# Patient Record
Sex: FEMALE | Race: WHITE | Employment: OTHER | URBAN - METROPOLITAN AREA
[De-identification: names, ages, dates, MRNs, and addresses within clinical notes are randomized per-mention and may not be internally consistent; named-entity substitution may affect disease eponyms.]

---

## 2017-03-06 ENCOUNTER — APPOINTMENT (OUTPATIENT)
Dept: CT IMAGING | Age: 82
DRG: 066 | End: 2017-03-06
Attending: EMERGENCY MEDICINE
Payer: MEDICARE

## 2017-03-06 ENCOUNTER — HOSPITAL ENCOUNTER (INPATIENT)
Age: 82
LOS: 3 days | Discharge: NURSING, REHAB OR LONG-TERM CARE | DRG: 066 | End: 2017-03-09
Attending: EMERGENCY MEDICINE | Admitting: INTERNAL MEDICINE
Payer: MEDICARE

## 2017-03-06 ENCOUNTER — APPOINTMENT (OUTPATIENT)
Dept: MRI IMAGING | Age: 82
DRG: 066 | End: 2017-03-06
Attending: EMERGENCY MEDICINE
Payer: MEDICARE

## 2017-03-06 ENCOUNTER — APPOINTMENT (OUTPATIENT)
Dept: GENERAL RADIOLOGY | Age: 82
DRG: 066 | End: 2017-03-06
Attending: EMERGENCY MEDICINE
Payer: MEDICARE

## 2017-03-06 DIAGNOSIS — R51.9 NONINTRACTABLE EPISODIC HEADACHE, UNSPECIFIED HEADACHE TYPE: ICD-10-CM

## 2017-03-06 DIAGNOSIS — R47.01 EXPRESSIVE APHASIA: Primary | ICD-10-CM

## 2017-03-06 DIAGNOSIS — E86.0 MILD DEHYDRATION: ICD-10-CM

## 2017-03-06 DIAGNOSIS — I63.512 ACUTE ISCHEMIC LEFT MCA STROKE (HCC): ICD-10-CM

## 2017-03-06 PROBLEM — I63.9 STROKE (HCC): Status: ACTIVE | Noted: 2017-03-06

## 2017-03-06 PROBLEM — R56.9 SEIZURE (HCC): Status: ACTIVE | Noted: 2017-03-06

## 2017-03-06 LAB
ALBUMIN SERPL BCP-MCNC: 3.9 G/DL (ref 3.4–5)
ALBUMIN/GLOB SERPL: 1 {RATIO} (ref 0.8–1.7)
ALP SERPL-CCNC: 88 U/L (ref 45–117)
ALT SERPL-CCNC: 28 U/L (ref 13–56)
ANION GAP BLD CALC-SCNC: 18 MMOL/L (ref 10–20)
ANION GAP BLD CALC-SCNC: 9 MMOL/L (ref 3–18)
APPEARANCE UR: CLEAR
APTT PPP: 26.5 SEC (ref 23–36.4)
AST SERPL W P-5'-P-CCNC: 19 U/L (ref 15–37)
BASOPHILS # BLD AUTO: 0 K/UL (ref 0–0.06)
BASOPHILS # BLD: 0 % (ref 0–2)
BILIRUB SERPL-MCNC: 0.6 MG/DL (ref 0.2–1)
BILIRUB UR QL: NEGATIVE
BUN BLD-MCNC: 10 MG/DL (ref 7–18)
BUN SERPL-MCNC: 10 MG/DL (ref 7–18)
BUN/CREAT SERPL: 10 (ref 12–20)
CA-I BLD-MCNC: 1.23 MMOL/L (ref 1.12–1.32)
CALCIUM SERPL-MCNC: 9.2 MG/DL (ref 8.5–10.1)
CHLORIDE BLD-SCNC: 94 MMOL/L (ref 100–108)
CHLORIDE SERPL-SCNC: 97 MMOL/L (ref 100–108)
CK MB CFR SERPL CALC: NORMAL % (ref 0–4)
CK MB SERPL-MCNC: <1 NG/ML (ref 5–25)
CK SERPL-CCNC: 61 U/L (ref 26–192)
CO2 BLD-SCNC: 25 MMOL/L (ref 19–24)
CO2 SERPL-SCNC: 27 MMOL/L (ref 21–32)
COLOR UR: YELLOW
CREAT SERPL-MCNC: 1.03 MG/DL (ref 0.6–1.3)
CREAT UR-MCNC: 0.9 MG/DL (ref 0.6–1.3)
DIFFERENTIAL METHOD BLD: NORMAL
EOSINOPHIL # BLD: 0.1 K/UL (ref 0–0.4)
EOSINOPHIL NFR BLD: 2 % (ref 0–5)
ERYTHROCYTE [DISTWIDTH] IN BLOOD BY AUTOMATED COUNT: 12.5 % (ref 11.6–14.5)
GLOBULIN SER CALC-MCNC: 3.9 G/DL (ref 2–4)
GLUCOSE BLD STRIP.AUTO-MCNC: 131 MG/DL (ref 74–106)
GLUCOSE BLD STRIP.AUTO-MCNC: 141 MG/DL (ref 70–110)
GLUCOSE SERPL-MCNC: 133 MG/DL (ref 74–99)
GLUCOSE UR STRIP.AUTO-MCNC: NEGATIVE MG/DL
HCT VFR BLD AUTO: 37.9 % (ref 35–45)
HCT VFR BLD CALC: 39 % (ref 36–49)
HGB BLD-MCNC: 12.9 G/DL (ref 12–16)
HGB BLD-MCNC: 13.3 G/DL (ref 12–16)
HGB UR QL STRIP: NEGATIVE
INR PPP: 0.9 (ref 0.8–1.2)
KETONES UR QL STRIP.AUTO: NEGATIVE MG/DL
LEUKOCYTE ESTERASE UR QL STRIP.AUTO: NEGATIVE
LYMPHOCYTES # BLD AUTO: 31 % (ref 21–52)
LYMPHOCYTES # BLD: 2.1 K/UL (ref 0.9–3.6)
MCH RBC QN AUTO: 30.4 PG (ref 24–34)
MCHC RBC AUTO-ENTMCNC: 34 G/DL (ref 31–37)
MCV RBC AUTO: 89.2 FL (ref 74–97)
MONOCYTES # BLD: 0.4 K/UL (ref 0.05–1.2)
MONOCYTES NFR BLD AUTO: 6 % (ref 3–10)
NEUTS SEG # BLD: 4.2 K/UL (ref 1.8–8)
NEUTS SEG NFR BLD AUTO: 61 % (ref 40–73)
NITRITE UR QL STRIP.AUTO: NEGATIVE
PH UR STRIP: 7 [PH] (ref 5–8)
PLATELET # BLD AUTO: 207 K/UL (ref 135–420)
PMV BLD AUTO: 9.6 FL (ref 9.2–11.8)
POTASSIUM BLD-SCNC: 3.8 MMOL/L (ref 3.5–5.5)
POTASSIUM SERPL-SCNC: 3.9 MMOL/L (ref 3.5–5.5)
PROT SERPL-MCNC: 7.8 G/DL (ref 6.4–8.2)
PROT UR STRIP-MCNC: NEGATIVE MG/DL
PROTHROMBIN TIME: 12 SEC (ref 11.5–15.2)
RBC # BLD AUTO: 4.25 M/UL (ref 4.2–5.3)
SODIUM BLD-SCNC: 133 MMOL/L (ref 136–145)
SODIUM SERPL-SCNC: 133 MMOL/L (ref 136–145)
SP GR UR REFRACTOMETRY: >1.03 (ref 1–1.03)
TROPONIN I SERPL-MCNC: <0.02 NG/ML (ref 0–0.06)
UROBILINOGEN UR QL STRIP.AUTO: 0.2 EU/DL (ref 0.2–1)
WBC # BLD AUTO: 6.9 K/UL (ref 4.6–13.2)

## 2017-03-06 PROCEDURE — 99285 EMERGENCY DEPT VISIT HI MDM: CPT

## 2017-03-06 PROCEDURE — 74011250637 HC RX REV CODE- 250/637: Performed by: EMERGENCY MEDICINE

## 2017-03-06 PROCEDURE — 51701 INSERT BLADDER CATHETER: CPT

## 2017-03-06 PROCEDURE — 96374 THER/PROPH/DIAG INJ IV PUSH: CPT

## 2017-03-06 PROCEDURE — 71010 XR CHEST PORT: CPT

## 2017-03-06 PROCEDURE — 80053 COMPREHEN METABOLIC PANEL: CPT | Performed by: EMERGENCY MEDICINE

## 2017-03-06 PROCEDURE — 77030011943

## 2017-03-06 PROCEDURE — 80047 BASIC METABLC PNL IONIZED CA: CPT

## 2017-03-06 PROCEDURE — 85730 THROMBOPLASTIN TIME PARTIAL: CPT | Performed by: EMERGENCY MEDICINE

## 2017-03-06 PROCEDURE — 82962 GLUCOSE BLOOD TEST: CPT

## 2017-03-06 PROCEDURE — 85610 PROTHROMBIN TIME: CPT | Performed by: EMERGENCY MEDICINE

## 2017-03-06 PROCEDURE — 81003 URINALYSIS AUTO W/O SCOPE: CPT | Performed by: EMERGENCY MEDICINE

## 2017-03-06 PROCEDURE — 93005 ELECTROCARDIOGRAM TRACING: CPT

## 2017-03-06 PROCEDURE — 74011250636 HC RX REV CODE- 250/636: Performed by: EMERGENCY MEDICINE

## 2017-03-06 PROCEDURE — 65660000000 HC RM CCU STEPDOWN

## 2017-03-06 PROCEDURE — 70496 CT ANGIOGRAPHY HEAD: CPT

## 2017-03-06 PROCEDURE — 85025 COMPLETE CBC W/AUTO DIFF WBC: CPT | Performed by: EMERGENCY MEDICINE

## 2017-03-06 PROCEDURE — 82550 ASSAY OF CK (CPK): CPT | Performed by: EMERGENCY MEDICINE

## 2017-03-06 PROCEDURE — 70551 MRI BRAIN STEM W/O DYE: CPT

## 2017-03-06 PROCEDURE — 70450 CT HEAD/BRAIN W/O DYE: CPT

## 2017-03-06 PROCEDURE — 84443 ASSAY THYROID STIM HORMONE: CPT | Performed by: INTERNAL MEDICINE

## 2017-03-06 RX ORDER — SODIUM CHLORIDE 9 MG/ML
100 INJECTION, SOLUTION INTRAVENOUS CONTINUOUS
Status: DISCONTINUED | OUTPATIENT
Start: 2017-03-06 | End: 2017-03-09 | Stop reason: HOSPADM

## 2017-03-06 RX ORDER — METOPROLOL SUCCINATE 50 MG/1
50 TABLET, EXTENDED RELEASE ORAL
Status: DISCONTINUED | OUTPATIENT
Start: 2017-03-06 | End: 2017-03-07

## 2017-03-06 RX ORDER — FERROUS SULFATE, DRIED 160(50) MG
1 TABLET, EXTENDED RELEASE ORAL 2 TIMES DAILY
Status: DISCONTINUED | OUTPATIENT
Start: 2017-03-07 | End: 2017-03-09 | Stop reason: HOSPADM

## 2017-03-06 RX ORDER — MORPHINE SULFATE 2 MG/ML
2 INJECTION, SOLUTION INTRAMUSCULAR; INTRAVENOUS
Status: COMPLETED | OUTPATIENT
Start: 2017-03-06 | End: 2017-03-06

## 2017-03-06 RX ORDER — LORATADINE 10 MG/1
10 TABLET ORAL
COMMUNITY
End: 2017-03-09

## 2017-03-06 RX ORDER — ATORVASTATIN CALCIUM 20 MG/1
80 TABLET, FILM COATED ORAL DAILY
Status: DISCONTINUED | OUTPATIENT
Start: 2017-03-07 | End: 2017-03-09 | Stop reason: HOSPADM

## 2017-03-06 RX ORDER — ALPRAZOLAM 0.5 MG/1
0.5 TABLET ORAL
Status: DISCONTINUED | OUTPATIENT
Start: 2017-03-06 | End: 2017-03-09 | Stop reason: HOSPADM

## 2017-03-06 RX ORDER — MORPHINE SULFATE 2 MG/ML
2 INJECTION, SOLUTION INTRAMUSCULAR; INTRAVENOUS ONCE
Status: DISCONTINUED | OUTPATIENT
Start: 2017-03-06 | End: 2017-03-06

## 2017-03-06 RX ORDER — LEVETIRACETAM 500 MG/1
500 TABLET ORAL 3 TIMES DAILY
COMMUNITY

## 2017-03-06 RX ORDER — DEXTROSE 50 % IN WATER (D50W) INTRAVENOUS SYRINGE
25-50 AS NEEDED
Status: DISCONTINUED | OUTPATIENT
Start: 2017-03-06 | End: 2017-03-09 | Stop reason: HOSPADM

## 2017-03-06 RX ORDER — CLOPIDOGREL BISULFATE 75 MG/1
75 TABLET ORAL DAILY
Status: DISCONTINUED | OUTPATIENT
Start: 2017-03-07 | End: 2017-03-09 | Stop reason: HOSPADM

## 2017-03-06 RX ORDER — MELATONIN
2000 DAILY
Status: DISCONTINUED | OUTPATIENT
Start: 2017-03-07 | End: 2017-03-09 | Stop reason: HOSPADM

## 2017-03-06 RX ORDER — TRANDOLAPRIL 2 MG/1
4 TABLET ORAL DAILY
Status: DISCONTINUED | OUTPATIENT
Start: 2017-03-07 | End: 2017-03-07

## 2017-03-06 RX ORDER — LOSARTAN POTASSIUM 50 MG/1
100 TABLET ORAL DAILY
Status: DISCONTINUED | OUTPATIENT
Start: 2017-03-07 | End: 2017-03-07

## 2017-03-06 RX ORDER — FUROSEMIDE 20 MG/1
20 TABLET ORAL
Status: DISCONTINUED | OUTPATIENT
Start: 2017-03-06 | End: 2017-03-07

## 2017-03-06 RX ORDER — LORATADINE 10 MG/1
10 TABLET ORAL
Status: DISCONTINUED | OUTPATIENT
Start: 2017-03-06 | End: 2017-03-09 | Stop reason: HOSPADM

## 2017-03-06 RX ORDER — HYDROCHLOROTHIAZIDE 12.5 MG/1
12.5 CAPSULE ORAL DAILY
Status: DISCONTINUED | OUTPATIENT
Start: 2017-03-07 | End: 2017-03-07

## 2017-03-06 RX ORDER — MAGNESIUM SULFATE 100 %
16 CRYSTALS MISCELLANEOUS AS NEEDED
Status: DISCONTINUED | OUTPATIENT
Start: 2017-03-06 | End: 2017-03-09 | Stop reason: HOSPADM

## 2017-03-06 RX ORDER — FUROSEMIDE 20 MG/1
20 TABLET ORAL
COMMUNITY

## 2017-03-06 RX ORDER — DEXTROSE MONOHYDRATE AND SODIUM CHLORIDE 5; .45 G/100ML; G/100ML
50 INJECTION, SOLUTION INTRAVENOUS CONTINUOUS
Status: DISCONTINUED | OUTPATIENT
Start: 2017-03-06 | End: 2017-03-06

## 2017-03-06 RX ORDER — SERTRALINE HYDROCHLORIDE 50 MG/1
25 TABLET, FILM COATED ORAL
Status: DISCONTINUED | OUTPATIENT
Start: 2017-03-06 | End: 2017-03-09 | Stop reason: HOSPADM

## 2017-03-06 RX ORDER — ASPIRIN 300 MG/1
300 SUPPOSITORY RECTAL
Status: COMPLETED | OUTPATIENT
Start: 2017-03-06 | End: 2017-03-06

## 2017-03-06 RX ORDER — ALPRAZOLAM 0.5 MG/1
0.5 TABLET ORAL
COMMUNITY
End: 2017-03-09

## 2017-03-06 RX ORDER — SERTRALINE HYDROCHLORIDE 25 MG/1
25 TABLET, FILM COATED ORAL
COMMUNITY

## 2017-03-06 RX ORDER — POTASSIUM CHLORIDE 750 MG/1
10 TABLET, EXTENDED RELEASE ORAL
COMMUNITY

## 2017-03-06 RX ORDER — NITROFURANTOIN MACROCRYSTALS 50 MG/1
50 CAPSULE ORAL DAILY
COMMUNITY
End: 2017-03-09

## 2017-03-06 RX ORDER — ASPIRIN 81 MG/1
81 TABLET ORAL DAILY
Status: DISCONTINUED | OUTPATIENT
Start: 2017-03-07 | End: 2017-03-08

## 2017-03-06 RX ORDER — METAPROTERENOL SULFATE 20 MG
TABLET ORAL
COMMUNITY
End: 2017-03-09

## 2017-03-06 RX ORDER — LEVETIRACETAM 500 MG/1
500 TABLET ORAL 3 TIMES DAILY
Status: DISCONTINUED | OUTPATIENT
Start: 2017-03-06 | End: 2017-03-08

## 2017-03-06 RX ORDER — POTASSIUM CHLORIDE 750 MG/1
10 TABLET, EXTENDED RELEASE ORAL
Status: DISCONTINUED | OUTPATIENT
Start: 2017-03-06 | End: 2017-03-09 | Stop reason: HOSPADM

## 2017-03-06 RX ORDER — SODIUM CHLORIDE 0.9 % (FLUSH) 0.9 %
5-10 SYRINGE (ML) INJECTION EVERY 8 HOURS
Status: DISCONTINUED | OUTPATIENT
Start: 2017-03-06 | End: 2017-03-09 | Stop reason: HOSPADM

## 2017-03-06 RX ORDER — SODIUM CHLORIDE 0.9 % (FLUSH) 0.9 %
5-10 SYRINGE (ML) INJECTION AS NEEDED
Status: DISCONTINUED | OUTPATIENT
Start: 2017-03-06 | End: 2017-03-09 | Stop reason: HOSPADM

## 2017-03-06 RX ORDER — AMLODIPINE BESYLATE 5 MG/1
5 TABLET ORAL DAILY
Status: DISCONTINUED | OUTPATIENT
Start: 2017-03-07 | End: 2017-03-07

## 2017-03-06 RX ORDER — ACETAMINOPHEN AND CODEINE PHOSPHATE 300; 30 MG/1; MG/1
2 TABLET ORAL
Status: DISCONTINUED | OUTPATIENT
Start: 2017-03-06 | End: 2017-03-06

## 2017-03-06 RX ORDER — INSULIN LISPRO 100 [IU]/ML
INJECTION, SOLUTION INTRAVENOUS; SUBCUTANEOUS
Status: DISCONTINUED | OUTPATIENT
Start: 2017-03-07 | End: 2017-03-09 | Stop reason: HOSPADM

## 2017-03-06 RX ORDER — MORPHINE SULFATE 4 MG/ML
2 INJECTION, SOLUTION INTRAMUSCULAR; INTRAVENOUS
Status: DISCONTINUED | OUTPATIENT
Start: 2017-03-06 | End: 2017-03-06 | Stop reason: SDUPTHER

## 2017-03-06 RX ORDER — OMEPRAZOLE 20 MG/1
20 CAPSULE, DELAYED RELEASE ORAL DAILY
Status: DISCONTINUED | OUTPATIENT
Start: 2017-03-07 | End: 2017-03-09 | Stop reason: HOSPADM

## 2017-03-06 RX ADMIN — MORPHINE SULFATE 2 MG: 2 INJECTION, SOLUTION INTRAMUSCULAR; INTRAVENOUS at 20:59

## 2017-03-06 RX ADMIN — ASPIRIN 300 MG: 300 SUPPOSITORY RECTAL at 22:52

## 2017-03-06 RX ADMIN — SODIUM CHLORIDE 100 ML/HR: 900 INJECTION, SOLUTION INTRAVENOUS at 19:45

## 2017-03-06 NOTE — ED PROVIDER NOTES
HPI Comments: 6:33 PM     Anali Correia is a 80 y.o. female presenting to the ED with her  for evaluation of AMS after waking up from a nap.  reports that she seems confused and has difficulty speaking. She was last seen at her baseline was maybe 4 hours ago however, he is uncertain since he had fallen asleep.  reports the patient had a recent URI. Patient denies headache, cp, sob, recent fall, or head trauma but she is unable to answer all questions when asked and cant get most of her words out. Hx limited by the condition of the patient. Written by Marianela Marks ED Scribe, as dictated by Adia Odell DO     Patient is a 80 y.o. female presenting with altered mental status. The history is provided by the spouse. No  was used. Altered mental status    This is a new problem. The current episode started 3 to 5 hours ago. The problem has not changed since onset. Associated symptoms include confusion. Pertinent negatives include no weakness and no numbness. Her past medical history is significant for CVA.         Past Medical History:   Diagnosis Date    Arthritis     Cerebrovascular accident (stroke) ([address] 12    Diabetes [address] [address]     Hypertension     Osteoarthritis of left hip 12/10/2012    Stroke [facility])     TIA    Thromboembolus ([facility] [address] 1964    PE       Past Surgical History:   Procedure Laterality Date    ABDOMEN SURGERY PROC UNLISTED  1964    exploratory     BREAST SURGERY PROCEDURE UNLISTED      breast biopsy left and right    HX CATARACT REMOVAL      bilateral    HX ORTHOPAEDIC      bilateral total hip     PROSTHETIC IMPLANT NOS      hips         Family History:   Problem Relation Age of Onset    Malignant Hyperthermia Neg Hx     Pseudocholinesterase Deficiency Neg Hx     Delayed Awakening Neg Hx     Post-op Nausea/Vomiting Neg Hx     Emergence Delirium Neg Hx     Post-op Cognitive Dysfunction Neg Hx     Other Neg Hx        Social History     Social History    Marital status:      Spouse name: N/A    Number of children: N/A    Years of education: N/A     Occupational History    Not on file. Social History Main Topics    Smoking status: Former Smoker    Smokeless tobacco: Not on file    Alcohol use No    Drug use: No    Sexual activity: Not on file     Other Topics Concern    Not on file     Social History Narrative         ALLERGIES: Ciprofloxacin and Penicillins    Review of Systems   Unable to perform ROS: Mental status change   Constitutional: Negative for chills and fever. HENT: Negative for congestion. Eyes: Negative for visual disturbance. Respiratory: Negative for cough and shortness of breath. Cardiovascular: Negative for chest pain. Gastrointestinal: Negative for abdominal pain and vomiting. Endocrine: Negative for polyuria. Genitourinary: Negative for difficulty urinating and dysuria. Musculoskeletal: Negative for back pain and myalgias. Neurological: Positive for speech difficulty. Negative for dizziness, syncope, weakness and numbness. Psychiatric/Behavioral: Positive for confusion. All other systems reviewed and are negative. There were no vitals filed for this visit. Physical Exam   Constitutional: She appears well-developed and well-nourished. HENT:   Head: Normocephalic and atraumatic. Mouth/Throat: Oropharynx is clear and moist.   Eyes: Conjunctivae and EOM are normal. Pupils are equal, round, and reactive to light. No scleral icterus. Neck: Normal range of motion. Neck supple. Cardiovascular: Intact distal pulses. Capillary refill < 3 seconds   Pulmonary/Chest: Effort normal and breath sounds normal. No respiratory distress. She has no wheezes. Abdominal: Soft. Bowel sounds are normal. She exhibits no distension. There is no tenderness. Musculoskeletal: Normal range of motion. She exhibits no edema. Lymphadenopathy:     She has no cervical adenopathy.    Neurological: She is alert. She has normal reflexes. No cranial nerve deficit. She exhibits normal muscle tone. Expressive aphasia. Patient is not following all my instructions. Patient appears confused. No drift of bilateral upper extremities. Strength 5/5 B/L UE and LE. Sensation intact. EOMI. Skin: Skin is warm and dry. She is not diaphoretic. Psychiatric:   confused   Nursing note and vitals reviewed. RESULTS:    CARDIAC MONITOR NOTE:  6:38 PM   Cardiac Rhythm: NSR  Rate: 81 bpm  Intervention: none      PULSE OXIMETRY NOTE:  6:38 PM   Pulse-ox is 95% on room air  Interpretation: normal  Intervention: none      EKG interpretation: (Preliminary)  7:43 PM  NSR. Rate 79 bpm. No ST elevation, no ST depression  EKG read by Duy Plunkett DO     9:29 PM  RADIOLOGY FINDINGS  Chest X-ray shows no acute process  Pending review by Radiologist  Recorded by Curly Ayala ED Scribe, as dictated by Duy Plunkett DO     CT HEAD WO CONT   Final Result   No acute intracranial abnormalities. Old infarcts.  Early changes of acute CVA  can be occult on CT.     Critical results findings were given to Dr. Raul Alexander 6:49 PM March 6, 2017  As read by the radiologist.    XR CHEST PORT    (Results Pending)   MRI BRAIN WO CONT    (Results Pending)   CTA HEAD NECK W WO CONT    (Results Pending)        Labs Reviewed   METABOLIC PANEL, COMPREHENSIVE - Abnormal; Notable for the following:        Result Value    Sodium 133 (*)     Chloride 97 (*)     Glucose 133 (*)     BUN/Creatinine ratio 10 (*)     GFR est non-AA 51 (*)     All other components within normal limits   URINALYSIS W/ RFLX MICROSCOPIC - Abnormal; Notable for the following:     Specific gravity >1.030 (*)     All other components within normal limits   GLUCOSE, POC - Abnormal; Notable for the following:     Glucose (POC) 141 (*)     All other components within normal limits   POC CHEM8 - Abnormal; Notable for the following:     CO2 (POC) 25 (*)     Glucose (POC) 131 (*)     Sodium (POC) 133 (*)     Chloride (POC) 94 (*)     All other components within normal limits   CBC WITH AUTOMATED DIFF   PROTHROMBIN TIME + INR   PTT   CARDIAC PANEL,(CK, CKMB & TROPONIN)   TSH 3RD GENERATION   LIPID PANEL   HEMOGLOBIN A1C WITH EAG   CBC W/O DIFF       Recent Results (from the past 12 hour(s))   GLUCOSE, POC    Collection Time: 03/06/17  6:33 PM   Result Value Ref Range    Glucose (POC) 141 (H) 70 - 110 mg/dL        MDM  Number of Diagnoses or Management Options  Acute ischemic left MCA stroke ([facility] [address]):   Elevated blood pressure:   Expressive aphasia:   Mild dehydration:   Nonintractable episodic headache, unspecified headache type:   Diagnosis management comments: Ddx: stroke, brain mass, seizure, metabolic, infectious, TIA. Code S    CT head, labs, EKG, CxR, consulted teleneurologist    IVF  Initial NIH stroke scale 6    Na 133  Cl 97  Getting ivf maintenance    Reassessed and pt appears to c/o  HA, gave analgesia    Discussed results with  and pt. Pt admitted  Neuro in house consulted    Pt remains with expressive aphasia but improved some, in that answers more questions correctly.             Amount and/or Complexity of Data Reviewed  Clinical lab tests: reviewed and ordered  Tests in the radiology section of CPT®: ordered and reviewed (MRI Brain w/o contrast, CT Head, CTA Head and neck, Chest X-ray)  Tests in the medicine section of CPT®: reviewed and ordered (EKG)  Discussion of test results with the performing providers: yes  Discuss the patient with other providers: yes (Louann Frances MD (Teleneurology)  Dee Bryson MD (Neurology)  Ashley Phelan MD (Hospitalist))  Independent visualization of images, tracings, or specimens: yes (EKG, Chest X-ray)    Risk of Complications, Morbidity, and/or Mortality  Presenting problems: high  Diagnostic procedures: high  Management options: high    Critical Care  Total time providing critical care: 30-74 minutes    ED Course     MEDICATIONS GIVEN:  Medications - No data to display     Procedures    PROGRESS NOTE:  6:33 PM  Initial assessment performed. Written by RODOLFO Castillo, as dictated by Jackie Damian DO     CONSULT NOTE:   6:38 PM  Jackie Damian DO spoke with Kristian Swanson MD   Specialty: Teleneurology  Discussed pt's hx over the telephone. Kristian Swanson MD will see the patient via [facility] after the patient returns from CT. Written by RODOLFO Castillo, as dictated by Jackie Damian DO. PROGRESS NOTE:   6:50 PM  Radiologist states that CT head is negative. Written by RODOLFO Castillo, as dictated by Jackie Damian DO.     CONSULT NOTE:   7:01 PM  Jackie Damian DO spoke with Kristian Swanson MD   Specialty: Teleneurology  Kristian Swanson MD has seen the patient. He states that the patient is not a tPA candidate. He requests that we order a stat CTA head and neck and to call him after it is done. Written by ROODLFO Castillo, as dictated by Jackie Damian DO.     CONSULT NOTE:   7:14 PM  Jackie Damian DO spoke with Philly Soto MD   Specialty: Neurology  Discussed pt's hx, CT head results, and Teleneurology consult over the telephone. He also wants a stat MRI brain without contrast after the CTA head and neck. Written by RODOLFO Castillo, as dictated by Jackie Damian DO.     CONSULT NOTE:   9:18 PM  Jackie Damian DO spoke with Philly Soto MD   Specialty: Neurology  Philly Soto MD agrees with plans for admission. He will be added to the treatment team.   Written by RODOLFO Castillo, as dictated by Jackie Damian DO.     CONSULT NOTE:   9:27 PM  Jackie Damian DO spoke with Yuridia Neville MD   Specialty: Internal Medicine  Discussed pt's hx, neurology consults, and radiology results over the telephone. He agrees to admit the patient to Telemetry.    Written by RODOLFO Castillo, as dictated by Jackie Damian DO .     ADMISSION NOTE:  9:27 PM  Patient is being admitted to the [facility] by Monica Casarez MD to Telemetry. The results of their tests and reasons for their admission have been discussed with them and/or available family. They convey agreement and understanding for the need to be admitted and for their admission diagnosis. Written by Bobby Iraheta ED Scribe, as dictated by Ramses Chaidez DO.     CONDITIONS ON ADMISSION:  9:27 PM   Deep Vein Thrombosis is not present at the time of admission. Thrombosis is not present at the time of admission. Pneumonia is not present at the time of admission. MRSA is not present at the time of admission. Wound infection is not present at the time of admission. Pressure Ulcer is not present at the time of admission. Written by Bobby Iraheta ED Scribe, as dictated by Ramses Chaidez DO.      CONSULT NOTE:   10:32 PM  Ramses Chaidez DO spoke with Meryle Pearson, MD   Specialty: Neurology  Discussed pt's CTA and MRI report with Meryle Pearson, MD. Pt will continue with care plan and be admitted here. His note is in connect care. Written by Bobby Iraheta ED Scribe, as dictated by Ramses Chaidez DO.     CLINICAL IMPRESSION:  1. Expressive aphasia    2. Nonintractable episodic headache, unspecified headache type    3. Elevated blood pressure    4. Acute ischemic left MCA stroke ([address]    5. Mild dehydration           ATTESTATIONS:  This note is prepared by Bobby Iraheta, acting as Scribe for Ramses Chaidez DO. Ramses Chaidez DO: The scribe's documentation has been prepared under my direction and personally reviewed by me in its entirety. I confirm that the note above accurately reflects all work, treatment, procedures, and medical decision making performed by me. 10:32 PM  I have spent 45 minutes of critical care time involved in lab review, consultations with specialist, family decision-making, and documentation. During this entire length of time I was immediately available to the patient. Critical Care:   The reason for providing this level of medical care for this critically ill patient was due a critical illness that impaired one or more vital organ systems such that there was a high probability of imminent or life threatening deterioration in the patients condition. This care involved high complexity decision making to assess, manipulate, and support vital system functions, to treat this degreee vital organ system failure and to prevent further life threatening deterioration of the patients condition.

## 2017-03-06 NOTE — ED TRIAGE NOTES
Per , patient is confused and unable to get her words out. Last seen normal approx 14:15 this afternoon. Sepsis Screening completed    (  )Patient meets SIRS criteria. (x  )Patient does not meet SIRS criteria.       SIRS Criteria is achieved when two or more of the following are present   Temperature < 96.8°F (36°C) or > 100.9°F (38.3°C)   Heart Rate > 90 beats per minute   Respiratory Rate > 20 breaths per minute   WBC count > 12,000 or <4,000 or > 10% bands  2

## 2017-03-06 NOTE — Clinical Note
Status[de-identified] Inpatient [101] Type of Bed: Telemetry [19] Inpatient Hospitalization Certified Necessary for the Following Reasons: 3. Patient receiving treatment that can only be provided in an inpatient setting (further clarification in H&P documentation) Admitting Diagnosis: Stroke [facility] [[id number]] Admitting Diagnosis: Expressive aphasia [029212] Admitting Diagnosis: Dehydration, mild [[id number]] Admitting Physician: Jason Langston [[id number]] Attending Physician: Jason Langston [[id number]] Estimated Length of Stay: > or = to 2 Midnights Discharge Plan[de-identified] Home with Office Follow-up

## 2017-03-06 NOTE — IP AVS SNAPSHOT
Nicolle Esteves 
 
 
 [address] [place] [id number] 
[phone] Patient: Jd Giordano MRN: [id number] ZSY:[id number]/30/[id number] You are allergic to the following Allergen Reactions Ciprofloxacin Rash Itching Penicillins Rash Itching Recent Documentation Height Weight Breastfeeding? BMI OB Status Smoking Status 1.702 m 75.9 kg No 27.85 kg/m2 Postmenopausal Former Smoker Emergency Contacts Name Discharge Info Relation Home Work Mobile Dania Michelle  Spouse [3]   [phone] Fco Wooten  Child [2] [phone] About your hospitalization You were admitted on:  March 6, 2017 You last received care in the:  [address] [place] [address] You were discharged on:  March 9, 2017 Unit phone number:  [phone] Why you were hospitalized Your primary diagnosis was:  Expressive Aphasia Your diagnoses also included:  Stroke (Hcc), Dehydration, Mild, Osteoarthritis Of Left Hip, Hypertension, Diabetes (Hcc), Seizure (Hcc) Providers Seen During Your Hospitalizations Provider Role Specialty Primary office phone Mayuri Matute DO Attending Provider Emergency Medicine [phone] Tania Kamara MD Attending Provider Internal Medicine [phone] Your Primary Care Physician (PCP) Primary Care Physician Office Phone Office Fax Ya Sotomayor [phone] Follow-up Information Follow up With Details Comments Contact Info [facility]  The SNF is responsible for making arrangements for the PCP visit while in house. [address] [facility] [address] 
[phone] [address] [facility] 
[phone] Current Discharge Medication List  
  
CONTINUE these medications which have CHANGED Dose & Instructions Dispensing Information Comments Morning Noon Evening Bedtime  
 aspirin delayed-release 325 mg tablet What changed:   
- medication strength 
- how much to take Your next dose is: Today, Tomorrow Other:  _________ Dose:  325 mg Take 1 Tab by mouth daily. Quantity:  10 Tab Refills:  0 CONTINUE these medications which have NOT CHANGED Dose & Instructions Dispensing Information Comments Morning Noon Evening Bedtime  
 amLODIPine 5 mg tablet Commonly known as:  Brynn Gabhector Your next dose is: Today, Tomorrow Other:  _________ Dose:  5 mg Take 1 tablet by mouth daily. Quantity:  20 tablet Refills:  0  
     
   
   
   
  
 atorvastatin 80 mg tablet Commonly known as:  LIPITOR Your next dose is: Today, Tomorrow Other:  _________ Dose:  80 mg Take 1 Tab by mouth daily. Quantity:  30 Tab Refills:  5 CALCIUM 600 + D 600-125 mg-unit Tab Generic drug:  calcium-cholecalciferol (d3) Your next dose is: Today, Tomorrow Other:  _________ Dose:  600 mg Take 600 mg by mouth two (2) times a day. Refills:  0 CENTRUM SILVER Tab tablet Generic drug:  multivitamins-minerals-lutein Your next dose is: Today, Tomorrow Other:  _________ Take  by mouth. Refills:  0 Cholecalciferol (Vitamin D3) 2,000 unit Cap capsule Commonly known as:  VITAMIN D3 Your next dose is: Today, Tomorrow Other:  _________ Take  by mouth daily. Refills:  0  
     
   
   
   
  
 clopidogrel 75 mg Tab Commonly known as:  PLAVIX Your next dose is: Today, Tomorrow Other:  _________ Dose:  75 mg Take 1 tablet by mouth daily. Quantity:  30 tablet Refills:  0  
     
   
   
   
  
 FISH OIL 1,000 mg Cap Generic drug:  omega-3 fatty acids-vitamin e  
   
 Your next dose is: Today, Tomorrow Other:  _________ Dose:  1 Cap Take 1 Cap by mouth daily. Refills:  0  
     
   
   
   
  
 hydroCHLOROthiazide 12.5 mg capsule Commonly known as:  Katy Casa Your next dose is: Today, Tomorrow Other:  _________ Dose:  12.5 mg Take 12.5 mg by mouth daily. Refills:  0 KEPPRA 500 mg tablet Generic drug:  levETIRAcetam  
   
Your next dose is: Today, Tomorrow Other:  _________ Dose:  500 mg Take 500 mg by mouth three (3) times daily. Refills:  0  
     
   
   
   
  
 LASIX 20 mg tablet Generic drug:  furosemide Your next dose is: Today, Tomorrow Other:  _________ Dose:  20 mg Take 20 mg by mouth every Monday, Wednesday, Friday. Refills:  0  
     
   
   
   
  
 losartan 100 mg tablet Commonly known as:  COZAAR Your next dose is: Today, Tomorrow Other:  _________ Dose:  100 mg Take 100 mg by mouth daily. Refills:  0  
     
   
   
   
  
 metFORMIN 850 mg tablet Commonly known as:  GLUCOPHAGE Your next dose is: Today, Tomorrow Other:  _________ Dose:  500 mg Take 500 mg by mouth every evening. Refills:  0  
     
   
   
   
  
 potassium chloride 10 mEq tablet Commonly known as:  K-DUR, KLOR-CON Your next dose is: Today, Tomorrow Other:  _________ Dose:  10 mEq Take 10 mEq by mouth every Monday, Wednesday, Friday. Take with lasix Refills:  0 PriLOSEC 20 mg capsule Generic drug:  omeprazole Your next dose is: Today, Tomorrow Other:  _________ Dose:  20 mg Take 20 mg by mouth daily. Refills:  0  
     
   
   
   
  
 TOPROL XL 50 mg XL tablet Generic drug:  metoprolol succinate Your next dose is: Today, Tomorrow Other:  _________ Dose:  50 mg Take 50 mg by mouth nightly. Refills:  0  
     
   
   
   
  
 ZOLOFT 25 mg tablet Generic drug:  sertraline Your next dose is: Today, Tomorrow Other:  _________ Dose:  25 mg Take 25 mg by mouth nightly. Refills:  0 STOP taking these medications CLARITIN 10 mg tablet Generic drug:  loratadine Eteumcsg-Pgva-Yadwtr-Hyalur Ac 418-375-55-2 mg Cap MACRODANTIN 50 mg capsule Generic drug:  nitrofurantoin  
   
  
 trandolapril 4 mg tablet Commonly known as:  MAVIK  
   
  
 XANAX 0.5 mg tablet Generic drug:  ALPRAZolam  
   
  
  
  
Where to Get Your Medications Information on where to get these meds will be given to you by the nurse or doctor. ! Ask your nurse or doctor about these medications  
  aspirin delayed-release 325 mg tablet Discharge Instructions DISCHARGE SUMMARY from Nurse The following personal items are in your possession at time of discharge: 
 
Dental Appliances: Uppers, Lowers, With patient Visual Aid: None Home Medications: None Jewelry: None Clothing: None Other Valuables: None Personal Items Sent to Safe: none PATIENT INSTRUCTIONS: 
 
 
F-face looks uneven A-arms unable to move or move unevenly S-speech slurred or non-existent T-time-call 911 as soon as signs and symptoms begin-DO NOT go Back to bed or wait to see if you get better-TIME IS BRAIN. Warning Signs of HEART ATTACK Call 911 if you have these symptoms: 
? Chest discomfort. Most heart attacks involve discomfort in the center of the chest that lasts more than a few minutes, or that goes away and comes back. It can feel like uncomfortable pressure, squeezing, fullness, or pain. ? Discomfort in other areas of the upper body. Symptoms can include pain or discomfort in one or both arms, the back, neck, jaw, or stomach. ? Shortness of breath with or without chest discomfort. ? Other signs may include breaking out in a cold sweat, nausea, or lightheadedness. Don't wait more than five minutes to call [address]! Fast action can save your life. Calling 911 is almost always the fastest way to get lifesaving treatment. Emergency Medical Services staff can begin treatment when they arrive  up to an hour sooner than if someone gets to the hospital by car. The discharge information has been reviewed with the patient. The patient verbalized understanding. Discharge medications reviewed with the patient and appropriate educational materials and side effects teaching were provided. Stroke: Care Instructions Your Care Instructions You have had a stroke. This means that the blood flow to a part of your brain was blocked for some time, which damages the nerve cells in that part of the brain. The part of your body controlled by that part of your brain may not function properly now. The brain is an amazing organ that can heal itself to some degree. The stroke you had damaged part of your brain. But other parts of your brain may take over in some way for the damaged areas. You have already started this process. Your doctor will talk with you about what you can do to prevent another stroke. High blood pressure, high cholesterol, and diabetes are all risk factors for stroke. If you have any of these conditions, work with your doctor to make sure they are under control. Other risk factors for stroke include being overweight, smoking, and not getting regular exercise. Going home may be hard for you and your family. The more you can try to do for yourself, the better. Remember to take each day one at a time. Follow-up care is a key part of your treatment and safety.  Be sure to make and go to all appointments, and call your doctor if you are having problems. It's also a good idea to know your test results and keep a list of the medicines you take. How can you care for yourself at home? · Enter a stroke rehabilitation (rehab) program, if your doctor recommends it. Physical, speech, and occupational therapies can help you manage bathing, dressing, eating, and other basics of daily living. · Do not drive until your doctor says it is okay. · It is normal to feel sad or depressed after a stroke. If these feelings last, talk to your doctor. · If you are having problems with urine leakage, go to the bathroom at regular times, including when you first wake up and at bedtime. Also, limit fluids after dinner. · If you are constipated, drink plenty of fluids, enough so that your urine is light yellow or clear like water. If you have kidney, heart, or liver disease and have to limit fluids, talk with your doctor before you increase the amount of fluids you drink. Set up a regular time for using the toilet. If you continue to have constipation, your doctor may suggest using a bulking agent, such as Metamucil, or a stool softener, laxative, or enema. Medicines · Take your medicines exactly as prescribed. Call your doctor if you think you are having a problem with your medicine. You may be taking several medicines. ACE (angiotensin-converting enzyme) inhibitors, angiotensin II receptor blockers (ARBs), beta-blockers, diuretics (water pills), and calcium channel blockers control your blood pressure. Statins help lower cholesterol. Your doctor may also prescribe medicines for depression, pain, sleep problems, anxiety, or agitation. · If your doctor has given you a blood thinner to prevent another stroke, be sure you get instructions about how to take your medicine safely. Blood thinners can cause serious bleeding problems.  
· Do not take any over-the-counter medicines or herbal products without talking to your doctor first. 
 · If you take birth control pills or hormone therapy, talk to your doctor about whether they are right for you. For family members and caregivers · Make the home safe. Set up a room so that your loved one does not have to climb stairs. Be sure the bathroom is on the same floor. Move throw rugs and furniture that could cause falls. Make sure that the lighting is good. Put grab bars and seats in tubs and showers. · Find out what your loved one can do and what he or she needs help with. Try not to do things for your loved one that your loved one can do on his or her own. Help him or her learn and practice new skills. · Visit and talk with your loved one often. Try doing activities together that you both enjoy, such as playing cards or board games. Keep in touch with your loved one's friends as much as you can. Encourage them to visit. · Take care of yourself. Do not try to do everything yourself. Ask other family members to help. Eat well, get enough rest, and take time to do things that you enjoy. Keep up with your own doctor visits, and make sure to take your medicines regularly. Get out of the house as much as you can. Join a local support group. Find out if you qualify for home health care visits to help with rehab or for adult day care. When should you call for help? Call 911 anytime you think you may need emergency care. For example, call if: 
· You have signs of another stroke. These may include: 
¨ Sudden numbness, tingling, weakness, or loss of movement in your face, arm, or leg, especially on only one side of your body. ¨ Sudden vision changes. ¨ Sudden trouble speaking. ¨ Sudden confusion or trouble understanding simple statements. ¨ Sudden problems with walking or balance. ¨ A sudden, severe headache that is different from past headaches. Call 911 even if these symptoms go away in a few minutes. Call your doctor now or seek immediate medical care if: · You have new symptoms that may be related to your stroke, such as falls or trouble swallowing. Watch closely for changes in your health, and be sure to contact your doctor if you have any problems. Where can you learn more? Go to http://candelaria-haylie.info/. Enter J143 in the search box to learn more about \"Stroke: Care Instructions. \" Current as of: June 4, 2016 Content Version: 11.1 © 7912-9750 [employer]. Care instructions adapted under license by [employer] (which disclaims liability or warranty for this information). If you have questions about a medical condition or this instruction, always ask your healthcare professional. [place] [address] any warranty or liability for your use of this information. Discharge Orders None [employer] Announcement We are excited to announce that we are making your provider's discharge notes available to you in [employer]. You will see these notes when they are completed and signed by the physician that discharged you from your recent hospital stay. If you have any questions or concerns about any information you see in [employer], please call the Health Information Department where you were seen or reach out to your Primary Care Provider for more information about your plan of care. Introducing [facility]! [employer] introduces [employer] patient portal. Now you can access parts of your medical record, email your doctor's office, and request medication refills online. 1. In your internet browser, go to [email or web address] 2. Click on the First Time User? Click Here link in the Sign In box. You will see the New Member Sign Up page. 3. Enter your [employer] Access Code exactly as it appears below. You will not need to use this code after youve completed the sign-up process. If you do not sign up before the expiration date, you must request a new code. · [employer] Access Code: [email or web address] Expires: 6/4/2017  6:23 PM 
 
4. Enter the last four digits of your Social Security Number (xxxx) and Date of Birth (mm/dd/yyyy) as indicated and click Submit. You will be taken to the next sign-up page. 5. Create a [employer] ID. This will be your [employer] login ID and cannot be changed, so think of one that is secure and easy to remember. 6. Create a [employer] password. You can change your password at any time. 7. Enter your Password Reset Question and Answer. This can be used at a later time if you forget your password. 8. Enter your e-mail address. You will receive e-mail notification when new information is available in [address] 9. Click Sign Up. You can now view and download portions of your medical record. 10. Click the Download Summary menu link to download a portable copy of your medical information. If you have questions, please visit the Frequently Asked Questions section of the [employer] website. Remember, [employer] is NOT to be used for urgent needs. For medical emergencies, dial 911. Now available from your iPhone and Android! General Information Please provide this summary of care documentation to your next provider. Patient Signature:  ____________________________________________________________ Date:  ____________________________________________________________  
  
Liliana Wilkes Provider Signature:  ____________________________________________________________ Date:  ____________________________________________________________

## 2017-03-06 NOTE — IP AVS SNAPSHOT
Current Discharge Medication List  
  
Take these medications at their scheduled times Dose & Instructions Dispensing Information Comments Morning Noon Evening Bedtime  
 amLODIPine 5 mg tablet Commonly known as:  Aurora Khan Your next dose is: Today, Tomorrow Other:  ____________ Dose:  5 mg Take 1 tablet by mouth daily. Quantity:  20 tablet Refills:  0  
     
   
   
   
  
 aspirin delayed-release 325 mg tablet Your next dose is: Today, Tomorrow Other:  ____________ Dose:  325 mg Take 1 Tab by mouth daily. Quantity:  10 Tab Refills:  0  
     
   
   
   
  
 atorvastatin 80 mg tablet Commonly known as:  LIPITOR Your next dose is: Today, Tomorrow Other:  ____________ Dose:  80 mg Take 1 Tab by mouth daily. Quantity:  30 Tab Refills:  5 CALCIUM 600 + D 600-125 mg-unit Tab Generic drug:  calcium-cholecalciferol (d3) Your next dose is: Today, Tomorrow Other:  ____________ Dose:  600 mg Take 600 mg by mouth two (2) times a day. Refills:  0 Cholecalciferol (Vitamin D3) 2,000 unit Cap capsule Commonly known as:  VITAMIN D3 Your next dose is: Today, Tomorrow Other:  ____________ Take  by mouth daily. Refills:  0  
     
   
   
   
  
 clopidogrel 75 mg Tab Commonly known as:  PLAVIX Your next dose is: Today, Tomorrow Other:  ____________ Dose:  75 mg Take 1 tablet by mouth daily. Quantity:  30 tablet Refills:  0  
     
   
   
   
  
 FISH OIL 1,000 mg Cap Generic drug:  omega-3 fatty acids-vitamin e Your next dose is: Today, Tomorrow Other:  ____________ Dose:  1 Cap Take 1 Cap by mouth daily. Refills:  0  
     
   
   
   
  
 hydroCHLOROthiazide 12.5 mg capsule Commonly known as:  Ambika Smith  
   
 Your next dose is: Today, Tomorrow Other:  ____________ Dose:  12.5 mg Take 12.5 mg by mouth daily. Refills:  0 KEPPRA 500 mg tablet Generic drug:  levETIRAcetam  
   
Your next dose is: Today, Tomorrow Other:  ____________ Dose:  500 mg Take 500 mg by mouth three (3) times daily. Refills:  0  
     
   
   
   
  
 LASIX 20 mg tablet Generic drug:  furosemide Your next dose is: Today, Tomorrow Other:  ____________ Dose:  20 mg Take 20 mg by mouth every Monday, Wednesday, Friday. Refills:  0  
     
   
   
   
  
 losartan 100 mg tablet Commonly known as:  COZAAR Your next dose is: Today, Tomorrow Other:  ____________ Dose:  100 mg Take 100 mg by mouth daily. Refills:  0  
     
   
   
   
  
 metFORMIN 850 mg tablet Commonly known as:  GLUCOPHAGE Your next dose is: Today, Tomorrow Other:  ____________ Dose:  500 mg Take 500 mg by mouth every evening. Refills:  0  
     
   
   
   
  
 potassium chloride 10 mEq tablet Commonly known as:  K-DUR, KLOR-CON Your next dose is: Today, Tomorrow Other:  ____________ Dose:  10 mEq Take 10 mEq by mouth every Monday, Wednesday, Friday. Take with lasix Refills:  0 PriLOSEC 20 mg capsule Generic drug:  omeprazole Your next dose is: Today, Tomorrow Other:  ____________ Dose:  20 mg Take 20 mg by mouth daily. Refills:  0  
     
   
   
   
  
 TOPROL XL 50 mg XL tablet Generic drug:  metoprolol succinate Your next dose is: Today, Tomorrow Other:  ____________ Dose:  50 mg Take 50 mg by mouth nightly. Refills:  0  
     
   
   
   
  
 ZOLOFT 25 mg tablet Generic drug:  sertraline Your next dose is: Today, Tomorrow Other:  ____________  Dose:  25 mg  
 Take 25 mg by mouth nightly. Refills:  0 Take these medications as directed Dose & Instructions Dispensing Information Comments Morning Noon Evening Bedtime CENTRUM SILVER Tab tablet Generic drug:  multivitamins-minerals-lutein Your next dose is: Today, Tomorrow Other:  ____________ Take  by mouth. Refills:  0 Where to Get Your Medications Information about where to get these medications is not yet available ! Ask your nurse or doctor about these medications  
  aspirin delayed-release 325 mg tablet

## 2017-03-07 ENCOUNTER — APPOINTMENT (OUTPATIENT)
Dept: CT IMAGING | Age: 82
DRG: 066 | End: 2017-03-07
Attending: PSYCHIATRY & NEUROLOGY
Payer: MEDICARE

## 2017-03-07 LAB
CHOLEST SERPL-MCNC: 134 MG/DL
ERYTHROCYTE [DISTWIDTH] IN BLOOD BY AUTOMATED COUNT: 12.6 % (ref 11.6–14.5)
EST. AVERAGE GLUCOSE BLD GHB EST-MCNC: 140 MG/DL
GLUCOSE BLD STRIP.AUTO-MCNC: 105 MG/DL (ref 70–110)
GLUCOSE BLD STRIP.AUTO-MCNC: 119 MG/DL (ref 70–110)
GLUCOSE BLD STRIP.AUTO-MCNC: 122 MG/DL (ref 70–110)
GLUCOSE BLD STRIP.AUTO-MCNC: 133 MG/DL (ref 70–110)
HBA1C MFR BLD: 6.5 % (ref 4.5–5.6)
HCT VFR BLD AUTO: 36.3 % (ref 35–45)
HDLC SERPL-MCNC: 62 MG/DL (ref 40–60)
HDLC SERPL: 2.2 {RATIO} (ref 0–5)
HGB BLD-MCNC: 12.3 G/DL (ref 12–16)
LDLC SERPL CALC-MCNC: 57.8 MG/DL (ref 0–100)
LIPID PROFILE,FLP: ABNORMAL
MCH RBC QN AUTO: 30.4 PG (ref 24–34)
MCHC RBC AUTO-ENTMCNC: 33.9 G/DL (ref 31–37)
MCV RBC AUTO: 89.6 FL (ref 74–97)
PLATELET # BLD AUTO: 206 K/UL (ref 135–420)
PMV BLD AUTO: 9.8 FL (ref 9.2–11.8)
RBC # BLD AUTO: 4.05 M/UL (ref 4.2–5.3)
TRIGL SERPL-MCNC: 71 MG/DL (ref ?–150)
VLDLC SERPL CALC-MCNC: 14.2 MG/DL
WBC # BLD AUTO: 8.7 K/UL (ref 4.6–13.2)

## 2017-03-07 PROCEDURE — 70450 CT HEAD/BRAIN W/O DYE: CPT

## 2017-03-07 PROCEDURE — 92610 EVALUATE SWALLOWING FUNCTION: CPT

## 2017-03-07 PROCEDURE — 97166 OT EVAL MOD COMPLEX 45 MIN: CPT

## 2017-03-07 PROCEDURE — 65660000000 HC RM CCU STEPDOWN

## 2017-03-07 PROCEDURE — 74011250637 HC RX REV CODE- 250/637: Performed by: INTERNAL MEDICINE

## 2017-03-07 PROCEDURE — 74011636320 HC RX REV CODE- 636/320: Performed by: INTERNAL MEDICINE

## 2017-03-07 PROCEDURE — 36415 COLL VENOUS BLD VENIPUNCTURE: CPT | Performed by: INTERNAL MEDICINE

## 2017-03-07 PROCEDURE — 74011250637 HC RX REV CODE- 250/637: Performed by: HOSPITALIST

## 2017-03-07 PROCEDURE — 97116 GAIT TRAINING THERAPY: CPT

## 2017-03-07 PROCEDURE — 82962 GLUCOSE BLOOD TEST: CPT

## 2017-03-07 PROCEDURE — 80061 LIPID PANEL: CPT | Performed by: INTERNAL MEDICINE

## 2017-03-07 PROCEDURE — 97162 PT EVAL MOD COMPLEX 30 MIN: CPT

## 2017-03-07 PROCEDURE — 93306 TTE W/DOPPLER COMPLETE: CPT

## 2017-03-07 PROCEDURE — 97535 SELF CARE MNGMENT TRAINING: CPT

## 2017-03-07 PROCEDURE — 74011250636 HC RX REV CODE- 250/636: Performed by: HOSPITALIST

## 2017-03-07 PROCEDURE — 74011000250 HC RX REV CODE- 250

## 2017-03-07 PROCEDURE — 83036 HEMOGLOBIN GLYCOSYLATED A1C: CPT | Performed by: INTERNAL MEDICINE

## 2017-03-07 PROCEDURE — 85027 COMPLETE CBC AUTOMATED: CPT | Performed by: INTERNAL MEDICINE

## 2017-03-07 RX ORDER — SODIUM CHLORIDE 9 MG/ML
INJECTION INTRAMUSCULAR; INTRAVENOUS; SUBCUTANEOUS
Status: COMPLETED
Start: 2017-03-07 | End: 2017-03-07

## 2017-03-07 RX ORDER — KETOROLAC TROMETHAMINE 15 MG/ML
15 INJECTION, SOLUTION INTRAMUSCULAR; INTRAVENOUS ONCE
Status: COMPLETED | OUTPATIENT
Start: 2017-03-07 | End: 2017-03-07

## 2017-03-07 RX ORDER — SODIUM CHLORIDE 9 MG/ML
10 INJECTION INTRAMUSCULAR; INTRAVENOUS; SUBCUTANEOUS ONCE
Status: COMPLETED | OUTPATIENT
Start: 2017-03-07 | End: 2017-03-07

## 2017-03-07 RX ORDER — ACETAMINOPHEN 325 MG/1
650 TABLET ORAL
Status: DISCONTINUED | OUTPATIENT
Start: 2017-03-07 | End: 2017-03-09 | Stop reason: HOSPADM

## 2017-03-07 RX ORDER — LABETALOL HCL 20 MG/4 ML
10 SYRINGE (ML) INTRAVENOUS
Status: DISCONTINUED | OUTPATIENT
Start: 2017-03-07 | End: 2017-03-09 | Stop reason: HOSPADM

## 2017-03-07 RX ADMIN — IOPAMIDOL 100 ML: 755 INJECTION, SOLUTION INTRAVENOUS at 08:00

## 2017-03-07 RX ADMIN — SODIUM CHLORIDE 10 ML: 9 INJECTION INTRAMUSCULAR; INTRAVENOUS; SUBCUTANEOUS at 10:38

## 2017-03-07 RX ADMIN — LEVETIRACETAM 500 MG: 500 TABLET, FILM COATED ORAL at 17:36

## 2017-03-07 RX ADMIN — KETOROLAC TROMETHAMINE 15 MG: 15 INJECTION, SOLUTION INTRAMUSCULAR; INTRAVENOUS at 11:50

## 2017-03-07 RX ADMIN — ACETAMINOPHEN 650 MG: 325 TABLET ORAL at 17:20

## 2017-03-07 RX ADMIN — SODIUM CHLORIDE 10 ML: 9 INJECTION, SOLUTION INTRAMUSCULAR; INTRAVENOUS; SUBCUTANEOUS at 10:38

## 2017-03-07 RX ADMIN — Medication 10 ML: at 17:28

## 2017-03-07 NOTE — ED NOTES
TRANSFER - OUT REPORT:    Verbal report given to Brentrosario Wagoner RN(name) on Maya Juárez  being transferred to [address] [place] [address](unit) for routine progression of care       Report consisted of patients Situation, Background, Assessment and   Recommendations(SBAR). Information from the following report(s) SBAR, ED Summary, Intake/Output, MAR, Recent Results, Med Rec Status and Cardiac Rhythm NSR was reviewed with the receiving nurse. Lines:   Peripheral IV 03/06/17 Right;Mid Antecubital (Active)   Site Assessment Clean, dry, & intact 3/6/2017  7:00 PM   Phlebitis Assessment 0 3/6/2017  7:00 PM   Infiltration Assessment 0 3/6/2017  7:00 PM   Dressing Status Clean, dry, & intact 3/6/2017  7:00 PM   Dressing Type Transparent 3/6/2017  7:00 PM   Hub Color/Line Status Green;Flushed 3/6/2017  7:00 PM   Action Taken Blood drawn 3/6/2017  7:00 PM   Alcohol Cap Used Yes 3/6/2017  7:00 PM        Opportunity for questions and clarification was provided.       Patient transported with:   Monitor  Registered Nurse  Tech

## 2017-03-07 NOTE — ED NOTES
Arrive to bedside and assumed care of pt. Pt leaving for CT via stretcher at this time. Will reassess upon arrival back to ER.

## 2017-03-07 NOTE — ROUTINE PROCESS
Bedside shift change report given to [address] (oncoming nurse) by Ronaldo Pratt RN (offgoing nurse). Report included the following information SBAR, Kardex and MAR.

## 2017-03-07 NOTE — PROGRESS NOTES
Hospitalist Progress Note    Patient: Jalen Cooper MRN: [id number]  CSN: [id number]    YOB: 1935  Age: 80 y.o. Sex: female    DOA: 3/6/2017 LOS:  LOS: 1 day                Assessment/Plan     Patient Active Problem List   Diagnosis Code    Osteoarthritis of left hip M16.12    Hypertension I10    Diabetes ([facility] [address] E11.9    Expressive aphasia R47.01    Stroke ([facility] [address] I63.9    Dehydration, mild E86.0    Seizure ([facility] [address] R56.9            79 yo female admitted for aphasia    CVA - MRI showed Acute ischemic infarct lateral left temporal lobe. Symptoms of expressive and receptive aphasia. Neurology following, follow echo and CTA head and neck. On aspirin and plavix. Permissive HTN, labetalol prn for BP above 200/110. Seizure disorder - continue keppra. DM - on SSI      Review of systems  General: No fevers or chills. Cardiovascular: No chest pain or pressure. No palpitations. Pulmonary: No shortness of breath. Gastrointestinal: No nausea, vomiting. Physical Exam:  General: Awake, cooperative, no acute distress    HEENT: NC, Atraumatic. PERRLA, anicteric sclerae. Lungs: CTA Bilaterally. No Wheezing/Rhonchi/Rales. Heart:  Regular  rhythm,  No murmur, No Rubs, No Gallops  Abdomen: Soft, Non distended, Non tender.  +Bowel sounds,   Extremities: No c/c/e  Psych:   Not anxious or agitated. Neurological Exam: patient with expressive aphasia, not able to understand question, need to repeat question several times, she tries to talk but does not make sense. Mental Status:  Alert , co-operative ,.    Cranial Nerves:  PERRL, EOM's full, no nystagmus, no ptosis. Difficult to do other cranial nerve exam.    Motor:  5/5 strength in upper and lower proximal and distal muscles. Normal bulk and tone. Reflexes:  Deep tendon reflexes 2+/4 and symmetrical.    Sensory:  Not tested   Gait:  Not tested. Cerebellar:  Not tested.                       Vital signs/Intake and Output:  Visit Vitals    /63 (BP 1 Location: Left arm, BP Patient Position: At rest)    Pulse 80    Temp 98.3 °F (36.8 °C)    Resp 14    Ht 5' 7\" (1.702 m)    Wt 76.2 kg (168 lb)    SpO2 100%    Breastfeeding No    BMI 27.96 kg/m2     Current Shift:  03/07 0701 - 03/07 1900  In: 140 [P.O.:140]  Out: -   Last three shifts:               Labs: Results:       Chemistry Recent Labs      03/06/17 1900   GLU  133*   NA  133*   K  3.9   CL  97*   CO2  27   BUN  10   CREA  1.03   CA  9.2   AGAP  9   BUCR  10*   AP  88   TP  7.8   ALB  3.9   GLOB  3.9   AGRAT  1.0      CBC w/Diff Recent Labs      03/07/17   0559  03/06/17 1900   WBC  8.7  6.9   RBC  4.05*  4.25   HGB  12.3  12.9   HCT  36.3  37.9   PLT  206  207   GRANS   --   61   LYMPH   --   31   EOS   --   2      Cardiac Enzymes Recent Labs      03/06/17 1900   CPK  61   CKND1  Cannot be calulated      Coagulation Recent Labs      03/06/17 1900   PTP  12.0   INR  0.9   APTT  26.5       Lipid Panel Lab Results   Component Value Date/Time    Cholesterol, total 134 03/07/2017 05:59 AM    HDL Cholesterol 62 03/07/2017 05:59 AM    LDL, calculated 57.8 03/07/2017 05:59 AM    VLDL, calculated 14.2 03/07/2017 05:59 AM    Triglyceride 71 03/07/2017 05:59 AM    CHOL/HDL Ratio 2.2 03/07/2017 05:59 AM      BNP No results for input(s): BNPP in the last 72 hours.    Liver Enzymes Recent Labs      03/06/17 1900   TP  7.8   ALB  3.9   AP  88   SGOT  19      Thyroid Studies No results found for: T4, T3U, TSH, TSHEXT     Procedures/imaging: see electronic medical records for all procedures/Xrays and details which were not copied into this note but were reviewed prior to creation of Plan

## 2017-03-07 NOTE — PROGRESS NOTES
Problem: Dysphagia (Adult)  Goal: *Acute Goals and Plan of Care (Insert Text)  Dysphagia Present: mild    Recommendations:  Diet: regular diet  Meds: as tolerated (whole vs crushed in puree)  Aspiration Precautions  Other: single sips, small bites, slow rate, alternate solids/liquids    Goals: Patient will:  1. Tolerate PO trials with 0 s/s overt distress in 4/5 trials  2. Utilize compensatory swallow strategies/maneuvers (decrease bite/sip, size/rate, alt. liq/sol) with min cues in 4/5 trials  3. Perform oral-motor/laryngeal exercises to increase oropharyngeal swallow function with min cues  4. Complete an objective swallow study (i.e., MBSS) to assess swallow integrity, r/o aspiration, and determine of safest LRD, min A  Outcome: Progressing Towards Goal  SPEECH LANGUAGE PATHOLOGY BEDSIDE SWALLOW EVALUATION     Patient: Anali Correia (45 y.o. female)  Date: 3/7/2017  Primary Diagnosis: Stroke [facility])  Expressive aphasia  Dehydration, mild  Expressive aphasia        Precautions: aspiration         ASSESSMENT :  Based on the objective data described below, the patient presents with mild oropharyngeal dysphagia with severe fluent aphasia to be further assessed. Clinical evaluation of swallow completed with family at bedside. Pt alert however non-functional contextual communication. Followed 75% oral motor commands with max model with essentially intact oral motor function. PO assessment of puree and regular solids as well as thin liquids +straw single sips self-fed without overt s/s penetration/aspiration. Brief pharyngeal swallow delay to palpation with x2 swallows/bolus for thin liquids. Mildly labored mastication suspect complicated by perceived headache though 100% oral clearance. Educated family at bedside re: dysphagia in setting of CVA, aspiration risk and strategies to maximize safety with PO intake. Also briefly educated re: language deficits; will f/u for formal evaluation.  Recommend initiate regular diet with thin liquids with safe swallow strategies of single sips, small bites at slow rate with HOB >45 for PO intake and >30 post meal. ST to f/u 1-2 visits for diet tolerance and education, with further language assessment and POC to follow. Patient will benefit from skilled intervention to address the above impairments. Patients rehabilitation potential is considered to be Good  Factors which may influence rehabilitation potential include:   [ ]            None noted  [X]            Mental ability/status  [X]            Medical condition  [X]            Home/family situation and support systems  [ ]            Safety awareness  [ ]            Pain tolerance/management  [ ]            Other:        PLAN : regular diet, safe swallow strategies  Recommendations and Planned Interventions:  ST to f/u 1-2 visits for diet tolerance and education; speech-language evaluation  Frequency/Duration: Patient will be followed by speech-language pathology 1-2 times per day/4-7 days per week to address goals. Discharge Recommendations: Home Health, Outpatient and Skilled Nursing Facility       SUBJECTIVE:   Patient stated OK.       OBJECTIVE:       Past Medical History:   Diagnosis Date    Arthritis      Cerebrovascular accident (stroke) ([facility] [address] 12    Diabetes ([facility] [address]      Hypertension      Osteoarthritis of left hip 12/10/2012    Seizures ([address]      Stroke ([facility] [address]       TIA    Thromboembolus ([facility] [address] 1964     PE     Past Surgical History:   Procedure Laterality Date    ABDOMEN SURGERY PROC UNLISTED   1964     exploratory     BREAST SURGERY PROCEDURE UNLISTED         breast biopsy left and right    HX CATARACT REMOVAL         bilateral    HX ORTHOPAEDIC         bilateral total hip     PROSTHETIC IMPLANT NOS         hips     Prior Level of Function/Home Situation: per chart/family  Home Situation  Home Environment: Private residence  # Steps to Enter: 3  One/Two Story Residence: One story  Living Alone: No  Support Systems: Family member(s), Spouse/Significant Other/Partner  Patient Expects to be Discharged to[de-identified] Private residence  Current DME Used/Available at Home: Commode, bedside, Walker, rolling, Glucometer  Diet prior to admission: regular/thin  Current Diet:  NPO   Cognitive and Communication Status:  Neurologic State: Alert, Confused  Orientation Level: Disoriented X4  Cognition: Decreased attention/concentration, No command following           Oral Assessment:  Oral Assessment  Labial: No impairment  Dentition: Upper & lower dentures  Oral Hygiene: good  Lingual: No impairment  Velum: No impairment  Mandible: No impairment  P.O. Trials:  Patient Position: [facility]  Vocal quality prior to P.O.: No impairment  Consistency Presented: Thin liquid;Puree; Solid  How Presented: Self-fed/presented;Straw;Spoon  How Much:  (x2oz thin, x1oz puree, x3 solid)  Bolus Acceptance: No impairment  Bolus Formation/Control: Impaired  Type of Impairment: Delayed;Mastication  Propulsion: No impairment  Oral Residue: None  Initiation of Swallow: Delayed (# of seconds)  Laryngeal Elevation: Functional  Aspiration Signs/Symptoms: Infiltrate on chest xray  Pharyngeal Phase Characteristics: Double swallow  Effective Modifications: Small sips and bites  Cues for Modifications: None        Oral Phase Severity: Mild  Pharyngeal Phase Severity : Mild     GCODESwallowing:   Swallow Current Status CI= 1-19%   Swallow Goal Status CI= 1-19%     The severity rating is based on the following outcomes:  AUGUSTINE Noms Swallow Level 6              Clinical Judgement     PAIN:  Start of Eval: 5   End of Eval: 5   (per non-verbal scale, pt unable to rate)     After treatment:   [ ]            Patient left in no apparent distress sitting up in chair  [X]            Patient left in no apparent distress in bed  [X]            Call bell left within reach  [X]            Nursing notified  [X]            Family present  [ ]            Caregiver present  [ ]            Bed alarm activated      COMMUNICATION/EDUCATION:   [X]            Aspiration precautions; swallow safety; compensatory techniques. [X]            Patient/family have participated as able in goal setting and plan of care. [X]            Patient/family agree to work toward stated goals and plan of care. [ ]            Patient understands intent and goals of therapy; neutral about participation. [ ]            Patient unable to participate in goal setting/plan of care; educ ongoing with interdisciplinary staff  [ ]             Posted safety precautions in patient's room.      Thank you for this referral.  Savannah Marsh, SLP  Time Calculation: 23 mins

## 2017-03-07 NOTE — PROGRESS NOTES
Problem: Mobility Impaired (Adult and Pediatric)  Goal: *Acute Goals and Plan of Care (Insert Text)  Physical Therapy Goals  Initiated 3/7/2017 and to be accomplished within 7 day(s)  1. Patient will move from supine to sit and sit to supine in bed with supervision/set-up. 2. Patient will transfer from bed to chair and chair to bed with supervision/set-up using the least restrictive device. 3. Patient will perform sit to stand with supervision/set-up. 4. Patient will ambulate with supervision/set-up for >150 feet with the least restrictive device. 5. Patient will ascend/descend 4 stairs with handrail(s) with minimal assistance/contact guard assist for safe home entry. Outcome: Progressing Towards Goal  PHYSICAL THERAPY EVALUATION     Patient: Clau Nazario (89 y.o. female)  Date: 3/7/2017  Primary Diagnosis: Stroke [facility])  Expressive aphasia  Dehydration, mild  Expressive aphasia  Precautions:   Fall      ASSESSMENT :  Based on the objective data described below, Patient present to PT with functional mobility impairments with regard to bed mobility, transfers, gait, stair negotiation, balance, weakness, and overall tolerance for activity. Pt with expressive aphasia during 90% of PT session with a few proper responses when talking about her food tray. Pt with limited command following, decreased attention and decreased safety awareness thus requiring frequent re-orientation to task at hand and max verbal/tactile and visual cuing. During gait training pt ambulated a total of 20 feet initial 10 feet using HHA and last 10 feet with RW use as pt was to unsafe with HHA due to significant path deviations and shuffling gait pattern. Left pt in bed as requested with all needs met but with c/o a HA and what appears to be light sensitivity; Majo Mckeon RN notified. Recommend rehab at time of discharge. Patient will benefit from skilled intervention to address the above impairments.   Patients rehabilitation potential is considered to be Good  Factors which may influence rehabilitation potential include:   [ ]         None noted  [X]         Mental ability/status  [X]         Medical condition  [ ]         Home/family situation and support systems  [ ]         Safety awareness  [ ]         Pain tolerance/management  [ ]         Other:        PLAN :  Recommendations and Planned Interventions:  [X]           Bed Mobility Training             [X]    Neuromuscular Re-Education  [X]           Transfer Training                   [ ]    Orthotic/Prosthetic Training  [X]           Gait Training                          [ ]    Modalities  [X]           Therapeutic Exercises          [ ]    Edema Management/Control  [X]           Therapeutic Activities            [X]    Patient and Family Training/Education  [ ]           Other (comment):     Frequency/Duration: Patient will be followed by physical therapy daily to address goals. Discharge Recommendations: Rehab  Further Equipment Recommendations for Discharge: rolling walker       SUBJECTIVE:   Patient stated want some?       OBJECTIVE DATA SUMMARY:       Past Medical History:   Diagnosis Date    Arthritis      Cerebrovascular accident (stroke) ([facility] [address] 12    Diabetes ([facility] [address]      Hypertension      Osteoarthritis of left hip 12/10/2012    Seizures ([facility] [address]      Stroke ([facility] [address]       TIA    Thromboembolus ([facility] [address] 1964     PE     Past Surgical History:   Procedure Laterality Date    ABDOMEN SURGERY PROC UNLISTED   1964     exploratory     BREAST SURGERY PROCEDURE UNLISTED         breast biopsy left and right    HX CATARACT REMOVAL         bilateral    HX ORTHOPAEDIC         bilateral total hip     PROSTHETIC IMPLANT NOS         hips     Barriers to Learning/Limitations: yes;  sensory deficits-vision/hearing/speech and altered mental status (i.e.Sedation, Confusion)  Compensate with: visual, verbal, tactile, kinesthetic cues/model  Prior Level of Function/Home Situation: Per pt's family she was independent with ADLs and functional mobility. Home Situation  Home Environment: Private residence  # Steps to Enter: 3  Rails to Enter: No  One/Two Story Residence: One story  Living Alone: No  Support Systems: Spouse/Significant Other/Partner, Child(chidi)  Patient Expects to be Discharged to[de-identified] Unknown  Current DME Used/Available at Home: Commode, bedside, Walker, rolling, Glucometer  Critical Behavior:  Neurologic State: Alert  Orientation Level: Disoriented X4  Cognition: Decreased attention/concentration;Decreased command following  Safety/Judgement: Decreased awareness of need for safety  Psychosocial  Patient Behaviors: Calm; Cooperative  Family  Behaviors: Supportive  Needs Expressed: Emotional;Educational  Purposeful Interaction: Yes  Skin Condition/Temp: Dry;Warm  Family  Behaviors: Supportive   Skin Integumentary  Skin Color: Appropriate for ethnicity  Skin Condition/Temp: Dry;Warm   Strength:    Strength: Generally decreased, functional   Tone & Sensation:   Tone: Normal   Sensation: Intact   Range Of Motion:  AROM: Generally decreased, functional   PROM: Generally decreased, functional   Functional Mobility:  Bed Mobility:  Supine to Sit: Minimum assistance  Sit to Supine: Minimum assistance   Transfers:  Sit to Stand: Minimum assistance  Stand to Sit: Minimum assistance   Balance:   Sitting: Intact  Standing: Impaired; With support  Standing - Static: Fair  Standing - Dynamic : Fair  Ambulation/Gait Training:  Distance (ft): 20 Feet (ft)  Assistive Device: Walker, rolling;Gait belt  Ambulation - Level of Assistance: Minimal assistance   Gait Description (WDL): Exceptions to WDL  Gait Abnormalities: Decreased step clearance; Path deviations   Base of Support: Widened  Stance: Weight shift  Speed/Gali: Shuffled; Slow  Step Length: Right shortened;Left shortened  Swing Pattern: Right asymmetrical;Left asymmetrical   Interventions: Visual/Demos; Verbal cues; Tactile cues; Safety awareness training   Therapeutic Exercises:   Pt unable to follow commands to complete HEP  Pain:  Pain Scale 1: Adult Nonverbal Pain Scale  Pain Intensity 1: 5  Pain Location 1: Head  Pain Orientation 1: Anterior  Pain Description 1: Aching  Pain Intervention(s) 1: Nurse notified  Activity Tolerance:   Fair  Please refer to the flowsheet for vital signs taken during this treatment. After treatment:   [ ]         Patient left in no apparent distress sitting up in chair  [X]         Patient left in no apparent distress in bed  [X]         Call bell left within reach  [X]         Nursing notified  [X]         Caregiver present  [X]         Bed alarm activated      COMMUNICATION/EDUCATION:   [X]         Fall prevention education was provided and the patient/caregiver indicated understanding. [X]         Patient/family have participated as able in goal setting and plan of care. [X]         Patient/family agree to work toward stated goals and plan of care. [ ]         Patient understands intent and goals of therapy, but is neutral about his/her participation. [ ]         Patient is unable to participate in goal setting and plan of care.      Thank you for this referral.  Rosa Woodson PT, DPT         Time Calculation: 23 mins     Eval Complexity: History: MEDIUM  Complexity : 1-2 comorbidities / personal factors will impact the outcome/ POC Exam:HIGH Complexity : 4+ Standardized tests and measures addressing body structure, function, activity limitation and / or participation in recreation  Presentation: MEDIUM Complexity : Evolving with changing characteristics  Clinical Decision Making:Medium Complexity Pt ambulated <30 feet Overall Complexity:MEDIUM

## 2017-03-07 NOTE — ED NOTES
MRI delayed at this point as pt needs to stay in dept at this time until CTA is viewed by Dr. Janett Camacho to determine if urgent transfer is needed for intervention per MD.

## 2017-03-07 NOTE — PROGRESS NOTES
Clinical evaluation of swallow completed with recommendation for regular diet and thin liquids with swallow safety strategies including small sips/bites at slow rate, meds as tolerated.  Full report to follow.     Thank you for this referral,  Artemio Ambrocio, SLP

## 2017-03-07 NOTE — ED NOTES
Pt not surgical candidate per Dr. Nohemi Arriola. Spouse advised. MRI form completed and Terrie notified.

## 2017-03-07 NOTE — ROUTINE PROCESS
TRANSFER - IN REPORT:    Verbal report received from NIMO Scott RN(name) on Elvira Viry  being received from ED(unit) for routine progression of care      Report consisted of patients Situation, Background, Assessment and   Recommendations(SBAR). Information from the following report(s) SBAR, Kardex, ED Summary, Intake/Output and MAR was reviewed with the receiving nurse. Opportunity for questions and clarification was provided.

## 2017-03-07 NOTE — PROGRESS NOTES
conducted an initial consultation and Spiritual Assessment for Lencho James, who is a 80 y.o.,female. Patients Primary Language is: [place]. According to the patients EMR Rastafarian Affiliation is: Panda Silva. The reason the Patient came to the hospital is:   Patient Active Problem List    Diagnosis Date Noted    Expressive aphasia 03/06/2017    Stroke ([facility] [address] 03/06/2017    Dehydration, mild 03/06/2017    Seizure ([facility] [address] 03/06/2017    Hypertension     Diabetes ([facility] [address]     Osteoarthritis of left hip 12/10/2012        The  provided the following Interventions:  Initiated a relationship of care and support. Explored issues of randy, belief, spirituality and Voodoo/ritual needs while hospitalized. Listened empathically. Provided chaplaincy education. Provided information about Spiritual Care Services. Offered prayer and assurance of continued prayers on patient's behalf. Chart reviewed. The following outcomes were achieved:  Patient shared limited information about both their medical narrative and spiritual journey/beliefs. Patient processed feeling about current hospitalization. Assessment:  Patient does not have any Voodoo/cultural needs that will affect patients preferences in health care. Patient did not indicate any spiritual or Voodoo issues which require Spiritual Care Services interventions at this time. Plan:  Chaplains will continue to follow and will provide pastoral care on an as needed/requested basis.  recommends bedside caregivers page  on duty if patient shows signs of acute spiritual or emotional distress. SABI Liao [facility]  [phone]

## 2017-03-07 NOTE — PROGRESS NOTES
Problem: Self Care Deficits Care Plan (Adult)  Goal: *Acute Goals and Plan of Care (Insert Text)  Occupational Therapy Goals  Initiated 3/7/2017 within 7 day(s). 1. Patient will perform grooming with supervision/set-up 2. Patient will perform upper body dressing with supervision/set-up. 3. Patient will perform lower body dressing with supervision/set-up. 4. Patient will perform toilet transfers with supervision/set-up. 5. Patient will perform all aspects of toileting with supervision/set-up. 6. Patient will participate in upper extremity therapeutic exercise/activities with supervision/set-up for 10 minutes. 7. Patient will utilize energy conservation techniques during functional activities with verbal cues. Outcome: Progressing Towards Goal  OCCUPATIONAL THERAPY EVALUATION     Patient: Elvira Baires (71 y.o. female)  Date: 3/7/2017  Primary Diagnosis: Stroke [facility])  Expressive aphasia  Dehydration, mild  Expressive aphasia        Precautions:   Fall      ASSESSMENT :  Based on the objective data described below, the patient presents with decreased functional strength, decreased functional balance, decreased overall activity tolerance limiting independence with ADLs. Pt with decreased command following, decreased attention to tasks, and decreased safety awareness. Pt supine in bed upon entering, agreeable to therapy. Pt completed supine to sit transfer with min A. While seated EOB, pt demonstrated ability to doff/don socks with supervision. Pt completed sit to stand transfer with min A. Pt completed functional/bathroom, toilet transfer, all aspects of toileting with min A. Once returning from bathroom, pt used RW with CGA. Seated EOB, pt ate soup with supervision. Pt returned to supine with min A. Pt left supine in bed with needs in reach. Pt would benefit from continued OT services to improve safety and independence with ADL tasks/transfers. RN notified pt with c/o headache.       Education: role of OT in acute care, plan of care     Patient will benefit from skilled intervention to address the above impairments. Patients rehabilitation potential is considered to be Fair  Factors which may influence rehabilitation potential include:   [ ]             None noted  [X]             Mental ability/status  [ ]             Medical condition  [ ]             Home/family situation and support systems  [X]             Safety awareness  [X]             Pain tolerance/management  [ ]             Other:        PLAN :  Recommendations and Planned Interventions:  [X]               Self Care Training                  [X]        Therapeutic Activities  [X]               Functional Mobility Training    [ ]        Cognitive Retraining  [X]               Therapeutic Exercises           [X]        Endurance Activities  [X]               Balance Training                   [X]        Neuromuscular Re-Education  [ ]               Visual/Perceptual Training     [X]   Home Safety Training  [X]               Patient Education                 [X]        Family Training/Education  [ ]               Other (comment):     Frequency/Duration: Patient will be followed by occupational therapy 3-5 times a week to address goals. Discharge Recommendations: Rehab  Further Equipment Recommendations for Discharge: TBD       SUBJECTIVE:   Patient stated That's a lot.       OBJECTIVE DATA SUMMARY:       Past Medical History:   Diagnosis Date    Arthritis      [facility]) [facility] [address] 12    Diabetes ([facility] [address] [facility]      Hypertension      Osteoarthritis of left hip 12/10/2012    Seizures ([address]      Stroke ([facility] [address]       TIA    Thromboembolus ([facility] [address] 1964     PE     Past Surgical History:   Procedure Laterality Date    ABDOMEN SURGERY PROC UNLISTED   1964     exploratory     BREAST SURGERY PROCEDURE UNLISTED         breast biopsy left and right    HX CATARACT REMOVAL         bilateral    HX ORTHOPAEDIC         bilateral total hip     PROSTHETIC IMPLANT NOS         hips     Barriers to Learning/Limitations: yes;  sensory deficits-vision/hearing/speech  Compensate with: visual, verbal, tactile, kinesthetic cues/model  GCODES (GO)n/a  Prior Level of Function/Home Situation: I with ADLs  Home Situation  Home Environment: Private residence  # Steps to Enter: 3  Rails to Enter: No  One/Two Story Residence: One story  Living Alone: No  Support Systems: Spouse/Significant Other/Partner, Child(chidi)  Patient Expects to be Discharged to[de-identified] Unknown  Current DME Used/Available at Home: Commode, bedside, Walker, rolling, Glucometer     Cognitive/Behavioral Status:  Neurologic State: Alert  Orientation Level: Unable to verbalize  Cognition: Decreased attention/concentration;Decreased command following  Safety/Judgement: Decreased awareness of need for safety  Coordination:  Coordination: Generally decreased, functional       Gross Motor Skills-Upper: Left Intact; Right Intact  Balance:  Sitting: Intact  Standing: Impaired; With support  Standing - Static: Fair  Standing - Dynamic : Fair  Strength:  Strength: Generally decreased, functional     Tone & Sensation:  Tone: Normal  Sensation: Intact     Range of Motion:  AROM: Generally decreased, functional  PROM: Generally decreased, functional     Functional Mobility and Transfers for ADLs:  Bed Mobility:  Supine to Sit: Minimum assistance  Sit to Supine: Minimum assistance     Transfers:  Sit to Stand: Minimum assistance              Toilet Transfer : Minimum assistance              Bathroom Mobility: Contact guard assistance;Minimum assistance  ADL Assessment:   Feeding: Setup     Lower Body Dressing: Supervision     Toileting: Minimum assistance     ADL Intervention:  Feeding  Feeding Assistance: Supervision/set-up  Food to Mouth: Supervision/set-up     Lower Body Dressing Assistance  Dressing Assistance: Supervision/set up  Socks: Supervision/set-up  Leg Crossed Method Used: Yes  Position Performed: Seated edge of bed     Toileting  Toileting Assistance: Minimum assistance  Bladder Hygiene: Contact guard assistance;Minimum assistance     Cognitive Retraining  Safety/Judgement: Decreased awareness of need for safety     Pain:  Pre-treatment: Pt c/o headache, however unable to rate  Post-treatment: Pt c/o headache, however unable to rate  Activity Tolerance:   good  Please refer to the flowsheet for vital signs taken during this treatment. After treatment:   [ ] Patient left in no apparent distress sitting up in chair  [X] Patient left in no apparent distress in bed  [X] Call bell left within reach  [X] Nursing notified  [ ] Caregiver present  [ ] Bed alarm activated      COMMUNICATION/EDUCATION:   [ ] Home safety education was provided and the patient/caregiver indicated understanding. [X] Patient/family have participated as able in goal setting and plan of care. [X] Patient/family agree to work toward stated goals and plan of care. [ ] Patient understands intent and goals of therapy, but is neutral about his/her participation. [ ] Patient is unable to participate in goal setting and plan of care.      Thank you for this referral.  Kayce Hi, MS OTR/L  Time Calculation: 23 mins

## 2017-03-07 NOTE — WOUND CARE
Pt screened by wound care due to melanie score of 17. No skin issues noted at this time. Wound care will continue to monitor during this hospitalization.

## 2017-03-07 NOTE — CONSULTS
NEUROLOGY CONSULTATION NOTE    Patient: Leopoldo Sero MRN: [id number]  CSN: [id number]    YOB: 1935  Age: 80 y.o. Sex: female    DOA: 3/6/2017 LOS:  LOS: 1 day        Requesting Physician: Dr. Zheng Figueroa  Reason for Consultation: Stroke              HISTORY OF PRESENT ILLNESS:   Leopoldo Sero is a 70-year-old female with prior history of stroke in 1976, diabetes, hypertension and history of seizures, who presented with difficulty with speech. The patient is not able to give the history. Most of the history was obtained from the chart review. She was suffering from upper respiratory tract infection signs for one week. She took a nap at 2 PM yesterday and woke up with speech difficulty in the evening time. She was confused. She was brought to ER right away and was seen by tele-neurology team. She wasnt regarded to be a tPA candidate. She is on aspirin 81 mg, clopidogrel and atorvastatin 80 mg at home. She also had a seizure in the past and was started on levetiracetam. It is unclear whether she had recent seizures. Stroke Work-up:  Brain MRI: 1. Acute ischemic infarct lateral left temporal lobe. No evidence of hemorrhage. 2. Interval evolution of chronic right parietal infarct. 3. Stable chronic areas of infarct left inferior lateral and perisylvian frontal lobe, left occipital lobe, and bilateral cerebellum. 4. Interval increase mild paranasal sinus mucosal thickening. CTA of head and neck: left M2 occluded. Report pending.   Echocardiogram: Pending  Lipid panel:   Lab Results   Component Value Date/Time    Cholesterol, total 134 03/07/2017 05:59 AM    HDL Cholesterol 62 03/07/2017 05:59 AM    LDL, calculated 57.8 03/07/2017 05:59 AM    VLDL, calculated 14.2 03/07/2017 05:59 AM    Triglyceride 71 03/07/2017 05:59 AM    CHOL/HDL Ratio 2.2 03/07/2017 05:59 AM     HbA1c:   Lab Results   Component Value Date/Time    Hemoglobin A1c 6.5 03/07/2017 05:57 AM       PAST MEDICAL HISTORY:  Past Medical History:   Diagnosis Date    Arthritis     [facility]stroke) [facility] [address] 1976    Diabetes ([facility] [facility]     Hypertension     Osteoarthritis of left hip 12/10/2012    Seizures ([facility] [facility]     [facility]     TIA    Throm[facility]embolus ([facility] 1964    PE     PAST SURGICAL HISTORY:  Past Surgical History:   Procedure Laterality Date    ABDOMEN SURGERY PROC UNLISTED  1964    exploratory     BREAST SURGERY PROCEDURE UNLISTED      breast biopsy left and right    HX CATARACT REMOVAL      bilateral    HX ORTHOPAEDIC      bilateral total hip     PROSTHETIC IMPLANT NOS      hips     FAMILY HISTORY:  Family History   Problem Relation Age of Onset    Malignant Hyperthermia Neg Hx     Pseudocholinesterase Deficiency Neg Hx     Delayed Awakening Neg Hx     Post-op Nausea/Vomiting Neg Hx     Emergence Delirium Neg Hx     Post-op Cognitive Dysfunction Neg Hx     Other Neg Hx      SOCIAL HISTORY:  Social History     Social History    Marital status:      Spouse name: N/A    Number of children: N/A    Years of education: N/A     Social History Main Topics    Smoking status: Former Smoker    Smokeless tobacco: None    Alcohol use No    Drug use: No    Sexual activity: Not Asked     Other Topics Concern    None     Social History Narrative     MEDICATIONS:  Current Facility-Administered Medications   Medication Dose Route Frequency    0.9% NaCl bacteriostatic (NORMAL SALINE) 0.9 % injection 10 mL  10 mL IntraVENous ONCE    0.9% NaCl bacteriostatic (NORMAL SALINE) 0.9 % injection        0.9% sodium chloride infusion  100 mL/hr IntraVENous CONTINUOUS    metoprolol succinate (TOPROL-XL) XL tablet 50 mg  50 mg Oral QHS    trandolapril (MAVIK) tablet 4 mg  4 mg Oral DAILY    multivitamin, tx-iron-ca-min (THERA-M w/ IRON) tablet 1 Tab  1 Tab Oral DAILY    cholecalciferol (VITAMIN D3) tablet 2,000 Units  2,000 Units Oral DAILY    hydroCHLOROthiazide (MICROZIDE) capsule 12.5 mg  12.5 mg Oral DAILY    losartan (COZAAR) tablet 100 mg  100 mg Oral DAILY    fish oil-omega-3 fatty acids 340-1,000 mg capsule 1 Cap  1 Cap Oral DAILY    calcium-vitamin D (OS-CLEMENT) 500 mg-200 unit tablet  1 Tab Oral BID    omeprazole (PRILOSEC) capsule 20 mg  20 mg Oral DAILY    amLODIPine (NORVASC) tablet 5 mg  5 mg Oral DAILY    clopidogrel (PLAVIX) tablet 75 mg  75 mg Oral DAILY    aspirin delayed-release tablet 81 mg  81 mg Oral DAILY    atorvastatin (LIPITOR) tablet 80 mg  80 mg Oral DAILY    sertraline (ZOLOFT) tablet 25 mg  25 mg Oral QHS    furosemide (LASIX) tablet 20 mg  20 mg Oral Q MON, WED & FRI    potassium chloride (K-DUR, KLOR-CON) tablet 10 mEq  10 mEq Oral Q MON, WED & FRI    ALPRAZolam (XANAX) tablet 0.5 mg  0.5 mg Oral QHS PRN    loratadine (CLARITIN) tablet 10 mg  10 mg Oral DAILY PRN    levETIRAcetam (KEPPRA) tablet 500 mg  500 mg Oral TID    sodium chloride (NS) flush 5-10 mL  5-10 mL IntraVENous Q8H    sodium chloride (NS) flush 5-10 mL  5-10 mL IntraVENous PRN    insulin lispro (HUMALOG) injection   SubCUTAneous AC&HS    glucose chewable tablet 16 g  16 g Oral PRN    glucagon (GLUCAGEN) injection 1 mg  1 mg IntraMUSCular PRN    dextrose (D50W) injection syrg 12.5-25 g  25-50 mL IntraVENous PRN     Prior to Admission medications    Medication Sig Start Date End Date Taking? Authorizing Provider   sertraline (ZOLOFT) 25 mg tablet Take 25 mg by mouth nightly. Yes Phys Other, MD   nitrofurantoin (MACRODANTIN) 50 mg capsule Take 50 mg by mouth daily. Yes Phys Other, MD   furosemide (LASIX) 20 mg tablet Take 20 mg by mouth every Monday, Wednesday, Friday. Yes Phys Other, MD   potassium chloride (K-DUR, KLOR-CON) 10 mEq tablet Take 10 mEq by mouth every Monday, Wednesday, Friday. Take with lasix   Yes Phys Other, MD   ALPRAZolam (XANAX) 0.5 mg tablet Take 0.5 mg by mouth nightly as needed for Anxiety. Yes Phys Other, MD   Pdliylkd-Mnzm-Lnqoqa-Hyalur Ac 115-287-27-2 mg cap Take  by mouth.    Yes Phys MD [place]   loratadine (CLARITIN) 10 mg tablet Take 10 mg by mouth daily as needed for Allergies. Yes Carl Russ MD   levETIRAcetam (KEPPRA) 500 mg tablet Take 500 mg by mouth three (3) times daily. Yes Carl Russ MD   atorvastatin (LIPITOR) 80 mg tablet Take 1 Tab by mouth daily. 9/1/15  Yes Carito Garcia MD   amlodipine (NORVASC) 5 mg tablet Take 1 tablet by mouth daily. 9/19/14  Yes Jenn Enriquez MD   clopidogrel (PLAVIX) 75 mg tablet Take 1 tablet by mouth daily. 9/19/14  Yes Jenn Enriquez MD   aspirin delayed-release 81 mg tablet Take 1 tablet by mouth daily. 9/19/14  Yes Jenn Enriquez MD   metformin (GLUCOPHAGE) 850 mg tablet Take 500 mg by mouth every evening. Yes Historical Provider   omeprazole (PRILOSEC) 20 mg capsule Take 20 mg by mouth daily. Yes Historical Provider   omega-3 fatty acids-vitamin e (FISH OIL) 1,000 mg cap Take 1 Cap by mouth daily. Yes Historical Provider   calcium-cholecalciferol, d3, (CALCIUM 600 + D) 600-125 mg-unit tab Take 600 mg by mouth two (2) times a day. Yes Historical Provider   hydrochlorothiazide (MICROZIDE) 12.5 mg capsule Take 12.5 mg by mouth daily. Yes Carl Russ MD   losartan (COZAAR) 100 mg tablet Take 100 mg by mouth daily. Yes Carl Russ MD   metoprolol-XL (TOPROL XL) 50 mg XL tablet Take 50 mg by mouth nightly. Yes Historical Provider   trandolapril 4 mg tablet Take 4 mg by mouth daily. Yes Historical Provider   multivitamins-minerals-lutein (CENTRUM SILVER) Tab Take  by mouth. Yes Historical Provider   Cholecalciferol, Vitamin D3, 2,000 unit cap Take  by mouth daily.      Yes Historical Provider       ALLERGIES:  Allergies   Allergen Reactions    Ciprofloxacin Rash and Itching    Penicillins Rash and Itching       Review of Systems  I'm unable to review the systems due to remarkable expressive aphasia    PHYSICAL EXAMINATION:     Visit Vitals    /64 (BP 1 Location: Left arm, BP Patient Position: At rest)    Pulse 80    Temp 98.3 °F (36.8 °C)    Resp 14    Ht 5' 7\" (1.702 m)    Wt 76.2 kg (168 lb)    SpO2 96%    Breastfeeding No    BMI 27.96 kg/m2      O2 Device: Room air  GENERAL: Pleasant, in no apparent distress. HEENT: Moist mucous membranes, sclerae anicteric, scalp is atraumatic. CVS: Regular rate and rhythm, no murmurs or gallops. No carotid bruits. PULMONARY: Clear to auscultation bilaterally. No rales or rhonchi. No wheezing. EXTREMITIES: Normal range of motion at all sites. No deformities. ABDOMEN: Soft, nontender. SKIN: No rashes or ecchymoses. Warm and dry. NEUROLOGIC: The patient is alert. It is difficult to assess her orientation. She has remarkable expressive and receptive aphasia. Shes not able to understand the proposed questions. She doesnt follow simple commands and she needs reinforcement with by the language. Shes not able to read. She has some spontaneous speech but doesnt make sense. There is no facial droop. Extraocular movements are intact only directions. Visual fields are difficult to assess but she blinks the threat bilaterally. PERRL. Tongue is midline. She moves all the extremities spontaneously. There is no pronator drift as I can appreciate. Shes able to give me handgrip bilaterally, slightly weak. Sensations intact to touch and noxious stimulus bilaterally. She can perform finger nose finger testing for coordination assessment with no dysmetria. Deep tendon reflexes seem to be symmetric, decreased on lower extremities.     Labs: Results:       Chemistry Recent Labs      03/06/17   1900   GLU  133*   NA  133*   K  3.9   CL  97*   CO2  27   BUN  10   CREA  1.03   CA  9.2   AGAP  9   BUCR  10*   AP  88   TP  7.8   ALB  3.9   GLOB  3.9   AGRAT  1.0      CBC w/Diff Recent Labs      03/07/17   0559  03/06/17   1900   WBC  8.7  6.9   RBC  4.05*  4.25   HGB  12.3  12.9   HCT  36.3  37.9   PLT  206  207   GRANS   --   61   LYMPH   --   31   EOS   --   2      Cardiac Enzymes Recent Labs      03/06/17 1900   CPK  61   CKND1  Cannot be calulated      Coagulation Recent Labs      03/06/17 1900   PTP  12.0   INR  0.9   APTT  26.5       Lipid Panel Lab Results   Component Value Date/Time    Cholesterol, total 134 03/07/2017 05:59 AM    HDL Cholesterol 62 03/07/2017 05:59 AM    LDL, calculated 57.8 03/07/2017 05:59 AM    VLDL, calculated 14.2 03/07/2017 05:59 AM    Triglyceride 71 03/07/2017 05:59 AM    CHOL/HDL Ratio 2.2 03/07/2017 05:59 AM      BNP No results for input(s): BNPP in the last 72 hours. Liver Enzymes Recent Labs      03/06/17 1900   TP  7.8   ALB  3.9   AP  88   SGOT  19      Thyroid Studies No results found for: T4, T3U, TSH, TSHEXT       Radiology:  Mri Brain Wo Cont    Result Date: 3/7/2017  History: CODE S, slurred speech, prior CVA Comparison 8/30/2015 PROCEDURE: Axial spin echo T1, FSE T2, FLAIR, T2 gradient and diffusion sequences, sagittal spin echo T1, and coronal spin echo T1 and T2 sequences of the brain were obtained without gadolinium. FINDINGS:  Diffusion:  There is new restricted diffusion signal involving lateral anterior and mid left temporal lobe local sulcal effacement. Additional small focus of mild increased diffusion signal without low ADC map left parieto-occipital subcortical white matter. Brain parenchyma:  Interval evolution of now chronic right parietal infarct with cystic encephalomalacia and gliosis. No significant change in cystic encephalomalacia and gliosis at site of chronic infarct inferior lateral left frontal lobe, left frontal perisylvian region, and left occipital lobe, and stable small chronic bilateral cerebellar infarcts. No evidence of new mass or hemorrhage or other new abnormal signal. Ventricles and sulci:  Mild diffuse central and cortical volume loss. Extra-axial:  No extra-axial fluid collection or mass is noted. Brain vasculature:  No vascular abnormality is appreciated on this routine brain MR examination.  Craniocervical junction:  Normal. Skull base, extracranial and calvarium:  Prior bilateral lens implants with interval development mucosal thickening inferior maxillary sinuses left greater than right and left sphenoid sinus and bilateral ethmoid sinuses. IACs and mastoids sella and skull unremarkable. Impression: 1. Acute ischemic infarct lateral left temporal lobe. No evidence of hemorrhage. 2. Interval evolution of chronic right parietal infarct. 3. Stable chronic areas of infarct left inferior lateral and perisylvian frontal lobe, left occipital lobe, and bilateral cerebellum. 4. Interval increase mild paranasal sinus mucosal thickening. Ct Head Wo Cont    Result Date: 3/6/2017  EXAM: CT head INDICATION: CODE S, slurred speech COMPARISON: August 30, 2015 TECHNIQUE: Axial CT imaging of the head was performed without intravenous contrast. DOSE REDUCTION:  One or more dose reduction techniques were used on this CT: automated exposure control, adjustment of the mAs and/or kVp according to patient's size, and iterative reconstruction techniques. The specific techniques utilized on this CT exam have been documented in the patient's electronic medical record. _______________ FINDINGS: BRAIN AND POSTERIOR FOSSA: The sulci, folia, ventricles and basal cisterns are within normal limits for the patient?s age. There is no intracranial hemorrhage, mass effect, or midline shift. There is an old left frontal lobe infarct with encephalomalacia unchanged. Patchy areas of low-attenuation seen in the periventricular and subcortical white matter suggesting moderate small vessel ischemic disease. There is an old right parietal lobe infarct with encephalomalacia. An old left frontoparietal lobe infarct with encephalomalacia. EXTRA-AXIAL SPACES AND MENINGES: There are no abnormal extra-axial fluid collections. CALVARIUM: Intact. SINUSES: There is mucoperiosteal thickening of the left ethmoid sinuses suggesting chronic sinus disease.  OTHER: None. _______________     IMPRESSION: No acute intracranial abnormalities. Old infarcts. Early changes of acute CVA can be occult on CT. Critical results findings were given to Dr. Weber Loss 6:49 PM March 6, 2017    Xr Chest [place]    Result Date: 3/7/2017  CHEST AP PORTABLE, 1 View Clinical History:  Strokelike symptoms. Comparison:  Several, most recent August 29, 2015. Findings: Stable biapical pleural parenchymal scarring. Stable small nodules in the left upper lobe, likely granulomas. Subtle haziness in the left base. Lungs are mildly hypoinflated. The cardiac silhouette is within normal limits. The pulmonary vascularity appear within normal limits. The aorta is elongated with arthrosclerotic calcifications. IMPRESSION: 1. Subtle haziness in the left base is likely atelectasis, however developing infiltrate cannot be completely excluded. ASSESSMENT/IMPRESSION:  Cerebrovascular accident involving the inferior branches of left MCA, resulting in expressive/receptive aphasia. The underlying etiology is either cardioembolic or artery to artery emboli. RECOMMENDATIONS:  1. Continue home dose aspirin, clopidogrel and atorvastatin for now. 2. Continue tele monitoring  3. Neuro checks per stroke protocol  4. Permissive hypertension (do not treat if BP is not >200/110, prefer prn labetolol 5mg)  5. IV normal saline continuous infusion 100-125 ml/h  6. Euglycemia with sliding scale insulin  7. Euthermia with acetaminophen 650mg Q6h prn for fever  8. PT/OT and speech pathology  9. CTA of head and neck: results pending  10. Echocardiogram with bubble study      I will follow the patient.  Please do not hesitate to return with any questions.    ------------------------------------  Anna Peoples MD  3/7/2017  8:36 AM

## 2017-03-07 NOTE — ED NOTES
NEUROLOGY PRELIMINARY NOTE    Patient: Maddie Ruelas        Sex: female          DOA: 3/6/2017  YOB: 1935      Age:  80 y.o.         LOS: 0 days       Comment: Discussed the case with the attending, Dr. Bertha Rodriguez. She needs admission for further work-up. She is already on aspirin, clopidorgel and atorvastatin. No change in medications required at this time. Initial recommendations:  Please use stroke admission order sets. 1. Continue aspirin, clopidogrel and atirvastatin at home dose. 2. Admit to telemetry with tele monitoring  3. Neuro checks per stroke protocol  4. Permissive hypertension (do not treat if BP is not >200/110, prefer prn labetolol 5mg)  5. IV normal saline continuous infusion 100-125 ml/h  6. Euglycemia with sliding scale insulin  7. Euthermia with acetaminophen 650mg Q6h prn for fever  8. Avoid urinary catheters please. 9. MRI without contrast: pending. 10. CTA of head and neck: pending  11. Echocardiogram with bubble study  12. Lipid panel  13. Hemoglobin A1c  14. SCDs and DVT prophylaxis    Further recommendations to follow.     Signed:  Hernando Ocasio MD  3/6/2017  9:18 PM

## 2017-03-07 NOTE — H&P
History & Physical    Patient: Ravin Bassett MRN: [id number]  CSN: [id number]    YOB: 1935  Age: 80 y.o. Sex: female      DOA: 3/6/2017  Primary Care Provider:  Jing Jacobson MD      Assessment/Plan     Patient Active Problem List   Diagnosis Code    Osteoarthritis of left hip M16.12    Hypertension I10    Diabetes ([address] E11.9    Expressive aphasia R47.01    Stroke ([facility] I63.9    Dehydration, mild E86.0    Seizure ([facility] [address] R56.9     -admit to tele for further eval   -concerns for CVA, she has hx of multiple CVA and TIAs in the past.   -CT head - neg for hemorrhage, pending CTA head and neck and MRI results   -permissive htn  -npo for now, speech and swallow eval  -bedrest, PT/OT  -check tsh, A1c, and lipid panel  -neurochecks   -2D echo   -IVF, SCDs  -accuchecks, ISS. Hold metformin  -Tylenol prn to maintain her temp   -she is already on ASA and plavix at home. Will cont for now  -also on atorvastatin 80mg po daily at home, cont. -SCDs        CC: Exp Aphasia       HPI:     Ravin Bassett is a 80 y.o. female who with medical hx as listed was brought to the hospital by her  for stroke eval. Hx per  with the patients permission as she is having difficulty speaking.  states they both have been suffering from some sort of URI symptoms of the past week. Today after they both went to take a nap around 2pm, patient went in their guest room to take a nap which was unsual of her to him. He at first thought she wants to sleep in that room because they both are sick. Later on when he went to go wake her up around 5 pm, he noticed she was having difficulty speaking, confused and unable to recognize him. He thought something was wrong and brought her to the Ed right away. She was last seen normal at her baseline prior to going to bed around 2pm earlier today. She did not have any complaints at that time.    She had a major stroke back in 1980s which involved facial droop and speech impairment and took her several months to get back to her baseline. He doesn't remember the details of her treatment at the time as he wasn't  to her then. But since then she has had some TIAs in between, last one being about couple of years ago. She also suffered from her first generalized seizure in Jan 2018. She was taken to [facility] where she was started on Keppra and told it was due to scar tissue from her previous strokes. No other complaint at this time including chest pain, palpitations, sob, abd pain, nausea, vomiting, headache, blurry vision. In the ED she was evaluated by tele neuro who suggested she is not a tPA candidate. Dr Sil Huang was informed who recommended to obtain CTA head and neck and MRI brain. They are done, results are pending.     CODE STATUS: FULL    Past Medical History:   Diagnosis Date    Arthritis     Cerebrovascular accident (stroke) ([address] 12    Diabetes ([address]     Hypertension     Osteoarthritis of left hip 12/10/2012    Seizures (HCC)     Stroke ([address]     TIA    Thromboembolus ([address] 1964    PE       Past Surgical History:   Procedure Laterality Date    ABDOMEN SURGERY PROC UNLISTED  1964    exploratory     BREAST SURGERY PROCEDURE UNLISTED      breast biopsy left and right    HX CATARACT REMOVAL      bilateral    HX ORTHOPAEDIC      bilateral total hip     PROSTHETIC IMPLANT NOS      hips       Family History   Problem Relation Age of Onset    Malignant Hyperthermia Neg Hx     Pseudocholinesterase Deficiency Neg Hx     Delayed Awakening Neg Hx     Post-op Nausea/Vomiting Neg Hx     Emergence Delirium Neg Hx     Post-op Cognitive Dysfunction Neg Hx     Other Neg Hx        Social History     Social History    Marital status:      Spouse name: N/A    Number of children: N/A    Years of education: N/A     Social History Main Topics    Smoking status: Former Smoker    Smokeless tobacco: None    Alcohol use No    Drug use: No    Sexual activity: Not Asked     Other Topics Concern    None     Social History Narrative       Prior to Admission medications    Medication Sig Start Date End Date Taking? Authorizing Provider   sertraline (ZOLOFT) 25 mg tablet Take 25 mg by mouth nightly. Yes Carl Russ MD   nitrofurantoin (MACRODANTIN) 50 mg capsule Take 50 mg by mouth daily. Yes Carl Russ MD   furosemide (LASIX) 20 mg tablet Take 20 mg by mouth every Monday, Wednesday, Friday. Yes Carl Russ MD   potassium chloride (K-DUR, KLOR-CON) 10 mEq tablet Take 10 mEq by mouth every Monday, Wednesday, Friday. Take with lasix   Yes Carl Russ MD   ALPRAZolam (XANAX) 0.5 mg tablet Take 0.5 mg by mouth nightly as needed for Anxiety. Yes Carl Russ MD   Renavdyz-Eruh-Fwadeg-Hyalur Ac [id number] mg cap Take  by mouth. Yes Carl Russ MD   loratadine (CLARITIN) 10 mg tablet Take 10 mg by mouth daily as needed for Allergies. Yes Carl Russ MD   levETIRAcetam (KEPPRA) 500 mg tablet Take 500 mg by mouth three (3) times daily. Yes Carl Russ MD   atorvastatin (LIPITOR) 80 mg tablet Take 1 Tab by mouth daily. 9/1/15  Yes Hannah Hsieh MD   amlodipine (NORVASC) 5 mg tablet Take 1 tablet by mouth daily. 9/19/14  Yes Graciela Ennis MD   clopidogrel (PLAVIX) 75 mg tablet Take 1 tablet by mouth daily. 9/19/14  Yes Graciela Ennis MD   aspirin delayed-release 81 mg tablet Take 1 tablet by mouth daily. 9/19/14  Yes Graciela Ennis MD   metformin (GLUCOPHAGE) 850 mg tablet Take 500 mg by mouth every evening. Yes Historical Provider   omeprazole (PRILOSEC) 20 mg capsule Take 20 mg by mouth daily. Yes Historical Provider   omega-3 fatty acids-vitamin e (FISH OIL) 1,000 mg cap Take 1 Cap by mouth daily. Yes Historical Provider   calcium-cholecalciferol, d3, (CALCIUM 600 + D) 600-125 mg-unit tab Take 600 mg by mouth two (2) times a day. Yes Historical Provider   hydrochlorothiazide (MICROZIDE) 12.5 mg capsule Take 12.5 mg by mouth daily. Yes Carl Russ, MD   losartan (COZAAR) 100 mg tablet Take 100 mg by mouth daily. Yes Carl Russ MD   metoprolol-XL (TOPROL XL) 50 mg XL tablet Take 50 mg by mouth nightly. Yes Historical Provider   trandolapril 4 mg tablet Take 4 mg by mouth daily. Yes Historical Provider   multivitamins-minerals-lutein (CENTRUM SILVER) Tab Take  by mouth. Yes Historical Provider   Cholecalciferol, Vitamin D3, 2,000 unit cap Take  by mouth daily. Yes Historical Provider       Allergies   Allergen Reactions    Ciprofloxacin Rash and Itching    Penicillins Rash and Itching       Review of Systems  Gen: No fever, chills, malaise, weight loss/gain. Heent: No headache, rhinorrhea, epistaxis, ear pain, hearing loss, sinus pain, neck pain/stiffness, sore throat. Heart: No chest pain, palpitations, CHAU, pnd, or orthopnea. Resp: No cough, hemoptysis, wheezing and shortness of breath. GI: No nausea, vomiting, diarrhea, constipation, melena or hematochezia. : No urinary obstruction, dysuria or hematuria. Derm: No rash, new skin lesion or pruritis. Musc/skeletal: no bone or joint complains. Vasc: No edema, cyanosis or claudication. Endo: No heat/cold intolerance, no polyuria,polydipsia or polyphagia. Neuro: No unilateral weakness, numbness, tingling. No seizures. Heme: No easy bruising or bleeding. Physical Exam:     Physical Exam:  Visit Vitals    /67    Pulse 79    Temp 98.2 °F (36.8 °C)    Resp 12    Ht 5' 7\" (1.702 m)    Wt 70.3 kg (155 lb)    SpO2 96%    BMI 24.28 kg/m2      O2 Device: Room air    Temp (24hrs), Av.2 °F (36.8 °C), Min:98.2 °F (36.8 °C), Max:98.2 °F (36.8 °C)             General:  Awake, awake, no distress. Follows most of the commands. Unable to understand everything but able to imitate when acted out. Head:  Normocephalic, without obvious abnormality, atraumatic. Eyes:  Conjunctivae/corneas clear, sclera anicteric, PERRL, EOMs intact.    Nose: Nares normal. No drainage or sinus tenderness. Throat: Lips, mucosa, and tongue normal.    Neck: Supple, symmetrical, trachea midline, no adenopathy. Lungs:   Clear to auscultation bilaterally. Heart:  Regular rate and rhythm, S1, S2 normal, no murmur, click, rub or gallop. Abdomen: Soft, non-tender. Bowel sounds normal. No masses,  No organomegaly. Extremities: Extremities normal, atraumatic, no cyanosis or edema. Capillary refill normal.   Pulses: 2+ and symmetric all extremities. Skin: Skin color pink/pale/mottled/flushed, turgor normal. No rashes or lesions   Neurologic: CNII-XII intact. Exp Aphasia, 4/5 strenght in all 4 ext       Labs Reviewed:      Recent Results (from the past 24 hour(s))   GLUCOSE, POC    Collection Time: 03/06/17  6:33 PM   Result Value Ref Range    Glucose (POC) 141 (H) 70 - 110 mg/dL   CBC WITH AUTOMATED DIFF    Collection Time: 03/06/17  7:00 PM   Result Value Ref Range    WBC 6.9 4.6 - 13.2 K/uL    RBC 4.25 4.20 - 5.30 M/uL    HGB 12.9 12.0 - 16.0 g/dL    HCT 37.9 35.0 - 45.0 %    MCV 89.2 74.0 - 97.0 FL    MCH 30.4 24.0 - 34.0 PG    MCHC 34.0 31.0 - 37.0 g/dL    RDW 12.5 11.6 - 14.5 %    PLATELET 058 118 - 096 K/uL    MPV 9.6 9.2 - 11.8 FL    NEUTROPHILS 61 40 - 73 %    LYMPHOCYTES 31 21 - 52 %    MONOCYTES 6 3 - 10 %    EOSINOPHILS 2 0 - 5 %    BASOPHILS 0 0 - 2 %    ABS. NEUTROPHILS 4.2 1.8 - 8.0 K/UL    ABS. LYMPHOCYTES 2.1 0.9 - 3.6 K/UL    ABS. MONOCYTES 0.4 0.05 - 1.2 K/UL    ABS. EOSINOPHILS 0.1 0.0 - 0.4 K/UL    ABS.  BASOPHILS 0.0 0.0 - 0.06 K/UL    DF AUTOMATED     METABOLIC PANEL, COMPREHENSIVE    Collection Time: 03/06/17  7:00 PM   Result Value Ref Range    Sodium 133 (L) 136 - 145 mmol/L    Potassium 3.9 3.5 - 5.5 mmol/L    Chloride 97 (L) 100 - 108 mmol/L    CO2 27 21 - 32 mmol/L    Anion gap 9 3.0 - 18 mmol/L    Glucose 133 (H) 74 - 99 mg/dL    BUN 10 7.0 - 18 MG/DL    Creatinine 1.03 0.6 - 1.3 MG/DL    BUN/Creatinine ratio 10 (L) 12 - 20      GFR est AA >60 >60 ml/min/1.73m2    GFR est non-AA 51 (L) >60 ml/min/1.73m2    Calcium 9.2 8.5 - 10.1 MG/DL    Bilirubin, total 0.6 0.2 - 1.0 MG/DL    ALT (SGPT) 28 13 - 56 U/L    AST (SGOT) 19 15 - 37 U/L    Alk.  phosphatase 88 45 - 117 U/L    Protein, total 7.8 6.4 - 8.2 g/dL    Albumin 3.9 3.4 - 5.0 g/dL    Globulin 3.9 2.0 - 4.0 g/dL    A-G Ratio 1.0 0.8 - 1.7     PROTHROMBIN TIME + INR    Collection Time: 03/06/17  7:00 PM   Result Value Ref Range    Prothrombin time 12.0 11.5 - 15.2 sec    INR 0.9 0.8 - 1.2     PTT    Collection Time: 03/06/17  7:00 PM   Result Value Ref Range    aPTT 26.5 23.0 - 36.4 SEC   CARDIAC PANEL,(CK, CKMB & TROPONIN)    Collection Time: 03/06/17  7:00 PM   Result Value Ref Range    CK 61 26 - 192 U/L    CK - MB <1.0 <3.6 ng/ml    CK-MB Index Cannot be calulated 0.0 - 4.0 %    Troponin-I, Qt. <0.02 0.00 - 0.06 NG/ML   POC CHEM8    Collection Time: 03/06/17  7:09 PM   Result Value Ref Range    CO2 (POC) 25 (H) 19 - 24 MMOL/L    Glucose (POC) 131 (H) 74 - 106 MG/DL    BUN (POC) 10 7 - 18 MG/DL    Creatinine (POC) 0.9 0.6 - 1.3 MG/DL    GFR-AA (POC) >60 >60 ml/min/1.73m2    GFR, non-AA (POC) >60 >60 ml/min/1.73m2    Sodium (POC) 133 (L) 136 - 145 MMOL/L    Potassium (POC) 3.8 3.5 - 5.5 MMOL/L    Calcium, ionized (POC) 1.23 1.12 - 1.32 MMOL/L    Chloride (POC) 94 (L) 100 - 108 MMOL/L    Anion gap (POC) 18 10 - 20      Hematocrit (POC) 39 36 - 49 %    Hemoglobin (POC) 13.3 12 - 16 G/DL   EKG, 12 LEAD, INITIAL    Collection Time: 03/06/17  7:43 PM   Result Value Ref Range    Ventricular Rate 79 BPM    Atrial Rate 79 BPM    P-R Interval 152 ms    QRS Duration 86 ms    Q-T Interval 398 ms    QTC Calculation (Bezet) 456 ms    Calculated P Axis 65 degrees    Calculated R Axis 48 degrees    Calculated T Axis 48 degrees    Diagnosis       Normal sinus rhythm  Normal ECG  When compared with ECG of 29-AUG-2015 19:32,  No significant change was found     URINALYSIS W/ RFLX MICROSCOPIC    Collection Time: 03/06/17 10:30 PM   Result Value Ref Range    Color YELLOW      Appearance CLEAR      Specific gravity >1.030 (H) 1.005 - 1.030    pH (UA) 7.0 5.0 - 8.0      Protein NEGATIVE  NEG mg/dL    Glucose NEGATIVE  NEG mg/dL    Ketone NEGATIVE  NEG mg/dL    Bilirubin NEGATIVE  NEG      Blood NEGATIVE  NEG      Urobilinogen 0.2 0.2 - 1.0 EU/dL    Nitrites NEGATIVE  NEG      Leukocyte Esterase NEGATIVE  NEG         Procedures/imaging: see electronic medical records for all procedures/Xrays and details which were not copied into this note but were reviewed prior to creation of Plan    70 minutes of care time spent in the direct evaluation and treatment of this high risk patient.      CC: Tiffanie Leal MD

## 2017-03-07 NOTE — PROGRESS NOTES
Readmission Risk Assessment:     High Risk and [facility]P/[facility] patients    RRAT Score:  21 or greater    Initial Assessment:chart chart reviewed consult received for discharge planning, pt admitted for CVA and expressive aphasia,patient lives at home with her ,cm attended IDR PT and OT ordered cm will follow thru with planning. If SNF recommended will need insurance auth which can take up to 48/hrs     Emergency Contact: listed in chart    Pertinent Medical Hx: see chart       PCP/Specialists:  [address]:      DME:         High Risk Care Transition Plan:  1. Evaluate for [facility], SNF, acute rehab, community care coordination of Resources. 2. Involve patient/caregiver in assessment, planning, education and implement of intervention. 3. CM daily patient care huddles/interdisciplinary rounds. 4. PCP/Specialist appointment within 48 hours of discharge unless otherwise specified. 5. Facilitate transportation and logistics for follow-up appointments. 6. Medication reconciliation [id number]  7. Discharge follow-up phone call within 2  4 days (NN, [facility], Nursing) CM follow up as assigned. 8. Formal handoff between hospital provider and post-acute provider to transition patient  9. Handoff to [address] Nurse Navigator or PCP practice.

## 2017-03-07 NOTE — PROGRESS NOTES
1412: Pt c/o severe headache. Toradol given at 1150 has not relieved headache. Dr. Car Alicia paged to inform him of situation. 1420: Dr. Car Alicia returned page. STAT head CT ordered. If clear, then pt can have more tylenol. 1444: Family members report that pt appears to have some right sided neglect. Upon what limited assessment this RN was able to perform as pt cannot follow commands, pt does appear to have some right sided neglect as she did not notice hand and finger movement to the right side of her head. 1715: Spoke with Dr. Car Alicia. CT did not show any signs of bleeding. Pt resting comfortably. Pt indicates that headache is better. Tylenol crushed and administered in apple sauce to relieve the rest of the headache. Dr. Car Alicia informed that pt is showing signs of right sided neglect which he states is to be expected with this kind of CVA.

## 2017-03-07 NOTE — ED NOTES
Back from MRI. Able to pantomime a fist. Answers a few questions appropriately but is not consistent. Spouse at bedside and Dr. Facundo Lopez at bedside.

## 2017-03-08 LAB
GLUCOSE BLD STRIP.AUTO-MCNC: 108 MG/DL (ref 70–110)
GLUCOSE BLD STRIP.AUTO-MCNC: 109 MG/DL (ref 70–110)
GLUCOSE BLD STRIP.AUTO-MCNC: 116 MG/DL (ref 70–110)
GLUCOSE BLD STRIP.AUTO-MCNC: 121 MG/DL (ref 70–110)
TSH SERPL DL<=0.05 MIU/L-ACNC: 0.58 UIU/ML (ref 0.36–3.74)

## 2017-03-08 PROCEDURE — 74011250636 HC RX REV CODE- 250/636: Performed by: EMERGENCY MEDICINE

## 2017-03-08 PROCEDURE — 74011250636 HC RX REV CODE- 250/636: Performed by: INTERNAL MEDICINE

## 2017-03-08 PROCEDURE — 65660000000 HC RM CCU STEPDOWN

## 2017-03-08 PROCEDURE — 74011250637 HC RX REV CODE- 250/637: Performed by: INTERNAL MEDICINE

## 2017-03-08 PROCEDURE — 97116 GAIT TRAINING THERAPY: CPT

## 2017-03-08 PROCEDURE — 82962 GLUCOSE BLOOD TEST: CPT

## 2017-03-08 PROCEDURE — 92526 ORAL FUNCTION THERAPY: CPT

## 2017-03-08 PROCEDURE — 74011250637 HC RX REV CODE- 250/637: Performed by: HOSPITALIST

## 2017-03-08 PROCEDURE — 92523 SPEECH SOUND LANG COMPREHEN: CPT

## 2017-03-08 RX ORDER — LEVETIRACETAM 5 MG/ML
500 INJECTION INTRAVASCULAR 3 TIMES DAILY
Status: DISCONTINUED | OUTPATIENT
Start: 2017-03-08 | End: 2017-03-09 | Stop reason: HOSPADM

## 2017-03-08 RX ORDER — ASPIRIN 325 MG
325 TABLET, DELAYED RELEASE (ENTERIC COATED) ORAL DAILY
Status: DISCONTINUED | OUTPATIENT
Start: 2017-03-09 | End: 2017-03-09 | Stop reason: HOSPADM

## 2017-03-08 RX ADMIN — LEVETIRACETAM 500 MG: 5 INJECTION INTRAVENOUS at 02:26

## 2017-03-08 RX ADMIN — POTASSIUM CHLORIDE 10 MEQ: 10 TABLET, EXTENDED RELEASE ORAL at 18:40

## 2017-03-08 RX ADMIN — ACETAMINOPHEN 650 MG: 325 TABLET ORAL at 11:59

## 2017-03-08 RX ADMIN — SODIUM CHLORIDE 100 ML/HR: 900 INJECTION, SOLUTION INTRAVENOUS at 02:27

## 2017-03-08 RX ADMIN — ATORVASTATIN CALCIUM 80 MG: 20 TABLET, FILM COATED ORAL at 08:39

## 2017-03-08 RX ADMIN — Medication 10 ML: at 18:40

## 2017-03-08 RX ADMIN — VITAMIN D, TAB 1000IU (100/BT) 2000 UNITS: 25 TAB at 08:40

## 2017-03-08 RX ADMIN — OMEPRAZOLE 20 MG: 20 CAPSULE, DELAYED RELEASE ORAL at 08:39

## 2017-03-08 RX ADMIN — LEVETIRACETAM 500 MG: 5 INJECTION INTRAVENOUS at 18:39

## 2017-03-08 RX ADMIN — ACETAMINOPHEN 650 MG: 325 TABLET ORAL at 18:55

## 2017-03-08 RX ADMIN — CLOPIDOGREL BISULFATE 75 MG: 75 TABLET, FILM COATED ORAL at 08:39

## 2017-03-08 RX ADMIN — LEVETIRACETAM 500 MG: 5 INJECTION INTRAVENOUS at 08:43

## 2017-03-08 RX ADMIN — ASPIRIN 81 MG: 81 TABLET, COATED ORAL at 08:39

## 2017-03-08 RX ADMIN — MULTIPLE VITAMINS W/ MINERALS TAB 1 TABLET: TAB at 08:39

## 2017-03-08 RX ADMIN — CALCIUM CARBONATE 500 MG (1,250 MG)-VITAMIN D3 200 UNIT TABLET 1 TABLET: at 08:39

## 2017-03-08 RX ADMIN — Medication 1 CAPSULE: at 08:39

## 2017-03-08 NOTE — PROGRESS NOTES
Hospitalist Progress Note    Patient: Lencho James MRN: [id number]  CSN: [id number]    YOB: 1935  Age: 80 y.o. Sex: female    DOA: 3/6/2017 LOS:  LOS: 2 days                Assessment/Plan     Patient Active Problem List   Diagnosis Code    Osteoarthritis of left hip M16.12    Hypertension I10    Diabetes ([facility] [address] E11.9    Expressive aphasia R47.01    Stroke ([facility] [address] I63.9    Dehydration, mild E86.0    Seizure ([facility] [address] R56.9            79 yo female admitted for aphasia     CVA - MRI showed Acute ischemic infarct lateral left temporal lobe. Symptoms of expressive and receptive aphasia. Neurology following,CTA head and neck with no sig. Echo with normal LVF, EF of 55%, no intra cardiac shunt. On aspirin and plavix.     Permissive HTN, labetalol prn for BP above 200/110.     Seizure disorder - continue keppra.     DM - on SSI    PT/OT, speech therapy.        Review of systems  General: No fevers or chills. Cardiovascular: No chest pain or pressure. No palpitations. Pulmonary: No shortness of breath. Gastrointestinal: No nausea, vomiting.         Physical Exam:  General: Awake, cooperative, no acute distress    HEENT: NC, Atraumatic. PERRLA, anicteric sclerae. Lungs: CTA Bilaterally. No Wheezing/Rhonchi/Rales. Heart:  Regular rhythm,  No murmur, No Rubs, No Gallops  Abdomen: Soft, Non distended, Non tender.  +Bowel sounds,   Extremities: No c/c/e  Psych:   Not anxious or agitated. Neurological Exam: patient with expressive aphasia, not able to understand question, need to repeat question several times, she tries to talk but does not make sense. Mental Status:  Alert , co-operative ,.    Cranial Nerves:  PERRL, EOM's full, no nystagmus, no ptosis. Difficult to do other cranial nerve exam.    Motor:  5/5 strength in upper and lower proximal and distal muscles. Normal bulk and tone.     Reflexes:  Deep tendon reflexes 2+/4 and symmetrical.    Sensory:  Not tested   Gait:  Not tested.        Cerebellar:  Not tested.                       Vital signs/Intake and Output:  Visit Vitals    /64 (BP 1 Location: Left arm, BP Patient Position: At rest;Supine; Head of bed elevated (Comment degrees))    Pulse 75    Temp 98.3 °F (36.8 °C)    Resp 14    Ht 5' 7\" (1.702 m)    Wt 75.9 kg (167 lb 5.3 oz)    SpO2 97%    Breastfeeding No    BMI 27.85 kg/m2     Current Shift:  03/08 0701 - 03/08 1900  In: 720 [P.O.:720]  Out: -   Last three shifts:  03/06 1901 - 03/08 0700  In: 9198 [P.O.:140; I.V.:1200]  Out: -             Labs: Results:       Chemistry Recent Labs      03/06/17 1900   GLU  133*   NA  133*   K  3.9   CL  97*   CO2  27   BUN  10   CREA  1.03   CA  9.2   AGAP  9   BUCR  10*   AP  88   TP  7.8   ALB  3.9   GLOB  3.9   AGRAT  1.0      CBC w/Diff Recent Labs      03/07/17   0559  03/06/17 1900   WBC  8.7  6.9   RBC  4.05*  4.25   HGB  12.3  12.9   HCT  36.3  37.9   PLT  206  207   GRANS   --   61   LYMPH   --   31   EOS   --   2      Cardiac Enzymes Recent Labs      03/06/17 1900   CPK  61   CKND1  Cannot be calulated      Coagulation Recent Labs      03/06/17 1900   PTP  12.0   INR  0.9   APTT  26.5       Lipid Panel Lab Results   Component Value Date/Time    Cholesterol, total 134 03/07/2017 05:59 AM    HDL Cholesterol 62 03/07/2017 05:59 AM    LDL, calculated 57.8 03/07/2017 05:59 AM    VLDL, calculated 14.2 03/07/2017 05:59 AM    Triglyceride 71 03/07/2017 05:59 AM    CHOL/HDL Ratio 2.2 03/07/2017 05:59 AM      BNP No results for input(s): BNPP in the last 72 hours.    Liver Enzymes Recent Labs      03/06/17 1900   TP  7.8   ALB  3.9   AP  88   SGOT  19      Thyroid Studies Lab Results   Component Value Date/Time    TSH 0.58 03/06/2017 07:00 PM        Procedures/imaging: see electronic medical records for all procedures/Xrays and details which were not copied into this note but were reviewed prior to creation of Plan

## 2017-03-08 NOTE — PROGRESS NOTES
Problem: Mobility Impaired (Adult and Pediatric)  Goal: *Acute Goals and Plan of Care (Insert Text)  Physical Therapy Goals  Initiated 3/7/2017 and to be accomplished within 7 day(s)  1. Patient will move from supine to sit and sit to supine in bed with supervision/set-up. 2. Patient will transfer from bed to chair and chair to bed with supervision/set-up using the least restrictive device. 3. Patient will perform sit to stand with supervision/set-up. 4. Patient will ambulate with supervision/set-up for >150 feet with the least restrictive device. 5. Patient will ascend/descend 4 stairs with handrail(s) with minimal assistance/contact guard assist for safe home entry. Outcome: Progressing Towards Goal  PHYSICAL THERAPY TREATMENT     Patient: Yajaira Sherman (76 y.o. female)  Date: 3/8/2017  Diagnosis: Stroke [facility])  Expressive aphasia  Dehydration, mild  Expressive aphasia Expressive aphasia       Precautions: Fall   Chart, physical therapy assessment, plan of care and goals were reviewed. ASSESSMENT:  The patient is demonstrating significant improvements in ambulatory mobility, ambulating about 200 feet with CGA using a rolling walker. However the patient continues to demonstrate impaired speech and command following. Patient requires repeated verbal and tactile cues for directions while ambulating. Otherwise the patient's gait is fairly steady with no major loss of balance. Patient was seated upright in a chair with spouse present after gait training. Patient may benefit from rehab placement due to cognitive impairments. Will continue PT to maximize safe and independent mobility. Progression toward goals:  [X]      Improving appropriately and progressing toward goals  [ ]      Improving slowly and progressing toward goals  [ ]      Not making progress toward goals and plan of care will be adjusted       PLAN:  Patient continues to benefit from skilled intervention to address the above impairments. Continue treatment per established plan of care. Discharge Recommendations:  Rehab  Further Equipment Recommendations for Discharge:  TBD       SUBJECTIVE:   Patient answers \"yes\" and \"no\" with cueing. Patient states \"okay\" when given commands but follows commands inconsistently. OBJECTIVE DATA SUMMARY:   Critical Behavior:  Neurologic State: Alert  Orientation Level: Disoriented X4  Cognition: Decreased attention/concentration, Decreased command following  Safety/Judgement: Lack of insight into deficits  Functional Mobility Training:  Bed Mobility:  Supine to Sit: Contact guard assistance  Sit to Supine: Contact guard assistance  Transfers:  Sit to Stand: Minimum assistance  Stand to Sit: Contact guard assistance (Verbal cues)  Balance:  Sitting: Intact  Standing: Intact; With support  Standing - Static: Good  Standing - Dynamic : Fair  Ambulation/Gait Training:  Distance (ft): 200 Feet (ft)  Assistive Device: Gait belt;Walker, rolling  Ambulation - Level of Assistance: Contact guard assistance  Gait Abnormalities: Decreased step clearance  Base of Support: Widened  Speed/Gali: Slow  Step Length: Right shortened;Left shortened  Swing Pattern: Right asymmetrical;Left asymmetrical  Interventions: Verbal cues; Safety awareness training;Visual/Demos; Tactile cues     Pain:  Pain Scale 1: Numeric (0 - 10)  Pain Intensity 1: 0  Activity Tolerance:   Good  Please refer to the flowsheet for vital signs taken during this treatment.   After treatment:   [X] Patient left in no apparent distress sitting up in chair  [ ] Patient left in no apparent distress in bed  [X] Call bell left within reach  [X] Nursing notified  [ ] Caregiver present  [ ] Bed alarm activated      [facility]   Time Calculation: 15 mins

## 2017-03-08 NOTE — PROGRESS NOTES
Problem: Communication Impaired (Adult)  Goal: *Acute Goals and Plan of Care (Insert Text)  Goals:  1. Pt will following 1-step commands in 50% trials with max A/demonstration. 2. Pt will respond to contextual/egocentric yes/no questions using multi-modal approach in 50% trials with max A.  3. Pt will produce automatic/rote speech using multi-modal approach in 50% trials with max A. Outcome: Progressing Towards Goal  SPEECH LANGUAGE PATHOLOGY EVALUATION     Patient: Lamonte Lockhart (80 y.o. female)  Date: 3/8/2017  Primary Diagnosis: Stroke [facility])  Expressive aphasia  Dehydration, mild  Expressive aphasia        Precautions:   Fall      ASSESSMENT :  Based on the objective data described below, the patient presents with severe fluent aphasia most c/w Wernicke's type. Speech-language evaluation completed with objective data as below. Essentially non-functional verbal output across tasks with 0 response to automatic speech using melodic intonation or repeat trials. Note x2 appropriate verbal responses to context \"OK\" and \"thank you\" during entire evaluation; otherwise verbal output essentially jargon with perseverative phrases. 0 response to repetition tasks. Further 0 meaningful response to basic yes/no questions despite cues/demonstration for multi-modality access (ex. Via gesture vs written response). Pt followed 3/10 (30%) basic commands with max A and hand-over-hand demonstration, though do note some improvement in comprehension for contextual tasks (ex. Meal set-up). Participation in tasks highly complicated by pt's lack of insight/awareness of communication deficits. Recommend initiate POC as above to address language function to establish basic communication system and facilitate participation in multi-disciplinary medical care. Patient will benefit from skilled intervention to address the above impairments.   Patients rehabilitation potential is considered to be Good  Factors which may influence rehabilitation potential include:   [ ]              None noted  [X]              Mental ability/status  [X]              Medical condition  [X]              Home/family situation and support systems  [ ]              Safety awareness  [ ]              Pain tolerance/management  [ ]              Other:        PLAN : language tx to address deficits as above  Recommendations and Planned Interventions:  Goals as above for basic functional communication skills  Frequency/Duration: Patient will be followed by speech-language pathology 1-2 times per day/4-7 days per week to address goals. Discharge Recommendations: Inpatient Rehab and Skilled Nursing Facility       SUBJECTIVE:   Patient stated OK, thank you.       OBJECTIVE:       Past Medical History:   Diagnosis Date    Arthritis      Cerebrovascular accident (stroke) ([address] 1976    Diabetes ([address]      Hypertension      Osteoarthritis of left hip 12/10/2012    Seizures ([address]      Stroke ([address]       TIA    Thromboembolus ([address] 1964     PE     Past Surgical History:   Procedure Laterality Date    ABDOMEN SURGERY PROC UNLISTED   1964     exploratory     BREAST SURGERY PROCEDURE UNLISTED         breast biopsy left and right    HX CATARACT REMOVAL         bilateral    HX ORTHOPAEDIC         bilateral total hip     PROSTHETIC IMPLANT NOS         hips     Prior Level of Function/Home Situation: per chart  Home Situation  Home Environment: Private residence  # Steps to Enter: 3  Rails to Enter: No  One/Two Story Residence: One story  Living Alone: No  Support Systems: Spouse/Significant Other/Partner, Child(chidi)  Patient Expects to be Discharged to[de-identified] Unknown  Current DME Used/Available at Home: Commode, bedside, Walker, rolling, Glucometer  Mental Status:  Neurologic State: Alert  Orientation Level: Disoriented X4  Cognition: Decreased attention/concentration, Decreased command following  Perception: Appears intact  Perseveration: Perseverates during conversation  Safety/Judgement: Lack of insight into deficits  Motor Speech:  Oral-Motor Structure/Motor Speech  Dysarthric Characteristics: None  Language Comprehension and Expression:  Auditory Comprehension  Auditory Impairment: Yes  Response to Basic Yes/No Questions (%): 0 %  One-Step Basic Commands (%): 30 %  Interfering Components: Attention - selective  Effective Techniques: Visual/Gestural cues;Stressing words  Cueing type: Multi modality  Cueing amount: Maximum  Verbal Expression  Primary Mode of Expression: Verbal  Initiation: No impairment  Automatic Speech Task: Impaired (comment)  Automatic speech task cueing type: Multi modality  Automatic speech task cueing amount: Maximum  Repetition: Impaired  Word Repetition (%): 0 %  Naming: Impaired  Confrontation (%): 0 %  Sentence Completion: Impaired  Word level (%): 0 %  Conversation: Fluent  Speech Characteristics: Jargon;Perseveration  Interfering Components: Limited error awareness  Overall Impairment: Severe  Cueing type: Multi modality  Cueing amount: Maximum                                                     GCODEComprehension:   Lang Comp Current Status CM= 80-99%   Lang Comp Goal Status CK= 40-59%        The severity rating is based on the following outcomes:         National Outcomes Measures (NOMS)   Professional Judgement        PAIN:  Start of Eval: 0  End of Eval: 0      After treatment:   [ ]              Patient left in no apparent distress sitting up in chair  [X]              Patient left in no apparent distress in bed  [X]              Call bell left within reach  [ ]              Nursing notified  [ ]              Caregiver present  [ ]              Bed alarm activated      COMMUNICATION/EDUCATION:   [ ] Patient/family have participated as able in goal setting and plan of care. [ ]  Patient/family agree to work toward stated goals and plan of care.   [ ]  Patient understands intent and goals of therapy, but is neutral about his/her participation. [X]  Patient is unable to participate in goal setting and plan of care.      Thank you for this referral.  Valnetino Mcghee, LUIZ  Time Calculation: 12 mins

## 2017-03-08 NOTE — ROUTINE PROCESS
Pt care received. Pt A/O x1 . Pt resting in bed. Denies pain. Pt stable. Call light within reach, bed in lowest position and locked.

## 2017-03-08 NOTE — PROGRESS NOTES
Patient first choice [facility] accepted patient,cm inform facility to start insurance auth for possible discharge tomorrow.

## 2017-03-08 NOTE — PROGRESS NOTES
Problem: Dysphagia (Adult)  Goal: *Acute Goals and Plan of Care (Insert Text)  Dysphagia Present: mild    Recommendations:  Diet: regular diet  Meds: as tolerated (whole vs crushed in puree)  Aspiration Precautions  Other: single sips, small bites, slow rate, alternate solids/liquids    Goals: Patient will:  1. Tolerate PO trials with 0 s/s overt distress in 4/5 trials  2. Utilize compensatory swallow strategies/maneuvers (decrease bite/sip, size/rate, alt. liq/sol) with min cues in 4/5 trials  3. Perform oral-motor/laryngeal exercises to increase oropharyngeal swallow function with min cues  4. Complete an objective swallow study (i.e., MBSS) to assess swallow integrity, r/o aspiration, and determine of safest LRD, min A     Outcome: Progressing Towards Goal  SPEECH LANGUAGE PATHOLOGY DYSPHAGIA TREATMENT     Patient: Nicho Mendoza (51 y.o. female)  Date: 3/8/2017  Diagnosis: Stroke [facility])  Expressive aphasia  Dehydration, mild  Expressive aphasia Expressive aphasia       Precautions: aspiration Fall      ASSESSMENT:  Mild oropharyngeal dysphagia     Second attempt at dysphagia f/u as pt refused PO from breakfast tray. Lunch tray and family present upon arrival. Pt with mild wet vocal quality and throat clear suspect 2/2 underlying respiratory issue; with cued throat clear resolved. Agreeable to only x1 mech soft bolus from meal tray, self-fed with mildly labored mastication though 100% oral clearance. Thin liquids +straw single sips self-fed with brief delayed pharyngeal swallow however no overt s/s penetration/aspiration. Educated family at bedside re: dysphagia in setting of CVA, aspiration risk and strategies for safety with PO intake. Recommend continue regular diet with safe swallow strategies, with ST to f/u to verify diet tolerance of regular solids as pt cooperates.      Progression toward goals:  [ ]         Improving appropriately and progressing toward goals  [X]         Improving slowly and progressing toward goals  [ ]         Not making progress toward goals and plan of care will be adjusted       PLAN: regular diet and thin liquids  Recommendations and Planned Interventions:  ST to f/u for diet tolerance, education  Patient continues to benefit from skilled intervention to address the above impairments. Continue treatment per established plan of care. Discharge Recommendations:  Rehab       SUBJECTIVE:   Patient stated Thank you. OBJECTIVE:   Cognitive and Communication Status:  Neurologic State: Alert  Orientation Level: Disoriented X4  Cognition: Decreased command following, Decreased attention/concentration  Perception: Appears intact  Perseveration: Perseverates during conversation  Safety/Judgement: Lack of insight into deficits  Dysphagia Treatment:  Oral Assessment:  Oral Assessment  Labial: No impairment  Dentition: Upper & lower dentures  Oral Hygiene: good  Lingual: No impairment  Velum: No impairment  Mandible: No impairment  P.O. Trials:              Patient Position: HOB 45              Vocal quality prior to P.O.: Wet              Consistency Presented:  Thin liquid, Mechanical soft              How Presented: Self-fed/presented, Straw              How Much:  (x1 mech soft, x6 thin)              Bolus Acceptance: No impairment              Bolus Formation/Control: No impairment              Type of Impairment: Mastication, Delayed              Propulsion: No impairment              Oral Residue: None              Initiation of Swallow: Delayed (# of seconds)              Laryngeal Elevation: Functional              Aspiration Signs/Symptoms: Infiltrate on chest xray              Pharyngeal Phase Characteristics: No impairment, issues, or problems               Effective Modifications: Small sips and bites, Alternate liquids/solids              Cues for Modifications: None                                Oral Phase Severity: Mild              Pharyngeal Phase Severity : Mild PAIN:  Start of Tx: 0  End of Tx: 0      After treatment:   [ ]              Patient left in no apparent distress sitting up in chair  [X]              Patient left in no apparent distress in bed  [ ]              Call bell left within reach  [ ]              Nursing notified  [X]              Family present  [ ]              Caregiver present  [ ]              Bed alarm activated         COMMUNICATION/EDUCATION:   [X]        Aspiration precautions; swallow safety; compensatory techniques  [ ]        Patient unable to participate in education; education ongoing with staff  [ ]         Posted safety precautions in patient's room.   [ ]         Oral-motor/laryngeal strengthening exercises        LUIZ Han  Time Calculation: 9 mins

## 2017-03-08 NOTE — ROUTINE PROCESS
Shift summary:  Pt remained stable. No acute changes. No complaints of pain. Bedside and Verbal shift change report given to ERIN Lakhani RN (oncoming nurse) by Gerber Singh. Kimberly Adkins RN (offgoing nurse).  Report included the following information SBAR, Kardex, Intake/Output, MAR, Recent Results and Cardiac Rhythm SR.

## 2017-03-08 NOTE — ROUTINE PROCESS
Bedside and Verbal shift change report given to NIMO Villavicencio RN (oncoming nurse) by ERIN Banegas RN (offgoing nurse). Report included the following information SBAR, Kardex, Intake/Output, MAR and Recent Results.

## 2017-03-08 NOTE — ROUTINE PROCESS
Bedside and Verbal shift change report given to Fanny Hernandes RN (oncoming nurse) by Sonu Lagunas RN   (offgoing nurse). Report included the following information SBAR, Kardex, Intake/Output and MAR.

## 2017-03-09 VITALS
WEIGHT: 167.33 LBS | HEIGHT: 67 IN | DIASTOLIC BLOOD PRESSURE: 71 MMHG | SYSTOLIC BLOOD PRESSURE: 160 MMHG | TEMPERATURE: 97.8 F | OXYGEN SATURATION: 100 % | BODY MASS INDEX: 26.26 KG/M2 | HEART RATE: 74 BPM | RESPIRATION RATE: 16 BRPM

## 2017-03-09 LAB
GLUCOSE BLD STRIP.AUTO-MCNC: 107 MG/DL (ref 70–110)
GLUCOSE BLD STRIP.AUTO-MCNC: 118 MG/DL (ref 70–110)

## 2017-03-09 PROCEDURE — 97116 GAIT TRAINING THERAPY: CPT

## 2017-03-09 PROCEDURE — 92507 TX SP LANG VOICE COMM INDIV: CPT

## 2017-03-09 PROCEDURE — 82962 GLUCOSE BLOOD TEST: CPT

## 2017-03-09 PROCEDURE — 74011250637 HC RX REV CODE- 250/637: Performed by: INTERNAL MEDICINE

## 2017-03-09 PROCEDURE — 74011250636 HC RX REV CODE- 250/636: Performed by: INTERNAL MEDICINE

## 2017-03-09 PROCEDURE — 74011250637 HC RX REV CODE- 250/637: Performed by: PSYCHIATRY & NEUROLOGY

## 2017-03-09 PROCEDURE — 92526 ORAL FUNCTION THERAPY: CPT

## 2017-03-09 PROCEDURE — 74011250637 HC RX REV CODE- 250/637: Performed by: HOSPITALIST

## 2017-03-09 RX ORDER — ASPIRIN 325 MG
325 TABLET, DELAYED RELEASE (ENTERIC COATED) ORAL DAILY
Qty: 10 TAB | Refills: 0 | Status: SHIPPED
Start: 2017-03-09

## 2017-03-09 RX ADMIN — SERTRALINE HYDROCHLORIDE 25 MG: 50 TABLET ORAL at 00:41

## 2017-03-09 RX ADMIN — Medication 10 ML: at 16:00

## 2017-03-09 RX ADMIN — LEVETIRACETAM 500 MG: 5 INJECTION INTRAVENOUS at 00:41

## 2017-03-09 RX ADMIN — ATORVASTATIN CALCIUM 80 MG: 20 TABLET, FILM COATED ORAL at 09:07

## 2017-03-09 RX ADMIN — ASPIRIN 325 MG: 325 TABLET, DELAYED RELEASE ORAL at 09:07

## 2017-03-09 RX ADMIN — ACETAMINOPHEN 650 MG: 325 TABLET ORAL at 15:58

## 2017-03-09 RX ADMIN — CALCIUM CARBONATE 500 MG (1,250 MG)-VITAMIN D3 200 UNIT TABLET 1 TABLET: at 00:41

## 2017-03-09 RX ADMIN — LEVETIRACETAM 500 MG: 5 INJECTION INTRAVENOUS at 09:05

## 2017-03-09 RX ADMIN — Medication 10 ML: at 00:42

## 2017-03-09 RX ADMIN — OMEPRAZOLE 20 MG: 20 CAPSULE, DELAYED RELEASE ORAL at 09:07

## 2017-03-09 RX ADMIN — Medication 1 CAPSULE: at 09:07

## 2017-03-09 RX ADMIN — MULTIPLE VITAMINS W/ MINERALS TAB 1 TABLET: TAB at 09:07

## 2017-03-09 RX ADMIN — VITAMIN D, TAB 1000IU (100/BT) 2000 UNITS: 25 TAB at 09:07

## 2017-03-09 RX ADMIN — Medication 10 ML: at 06:01

## 2017-03-09 RX ADMIN — CALCIUM CARBONATE 500 MG (1,250 MG)-VITAMIN D3 200 UNIT TABLET 1 TABLET: at 09:08

## 2017-03-09 RX ADMIN — CLOPIDOGREL BISULFATE 75 MG: 75 TABLET, FILM COATED ORAL at 09:07

## 2017-03-09 NOTE — ROUTINE PROCESS
Bedside, Verbal and Written shift change report given to NIRALI Oro (oncoming nurse) by Christiano Mcknight. MACARIO Barba (offgoing nurse). Report included the following information SBAR, Kardex, Intake/Output and Recent Results. Assumed care of pt laying in bed, responds to verbal commands, word salad, follows commands, remains on telemetry, room air. 2200  Pt status unchanged, scheduled med's given, pt tolerated well. 0000  Reassessed, pt status unchanged, sleeping at this time. 0200  Pt status unchanged, sleeping at this time. Appears asymptomatic.  0600  Pt sleeping, appears asymptomatic.      0715  Bedside, Verbal and Written shift change report given to NI Guajardo RN (oncoming nurse) by NIRALI Oro (offgoing nurse). Report included the following information SBAR, Kardex, Intake/Output and Recent Results.

## 2017-03-09 NOTE — PROGRESS NOTES
NEUROLOGY PROGRESS NOTE        Patient: Enedina Pradhan        Sex: female          DOA: 3/6/2017  YOB: 1935      Age:  80 y.o.         LOS: 3 days     Identification:  [id number] female with prior history of stroke in 1976, diabetes, hypertension and history of seizures, who presented with difficulty with speech.       SUBJECTIVE:   She continues to have aphasia.      Stroke Work-up:  Brain MRI: 1. Acute ischemic infarct lateral left temporal lobe. No evidence of hemorrhage. 2. Interval evolution of chronic right parietal infarct. 3. Stable chronic areas of infarct left inferior lateral and perisylvian frontal lobe, left occipital lobe, and bilateral cerebellum. 4. Interval increase mild paranasal sinus mucosal thickening. CTA of head and neck: 1. No evidence of new significant carotid artery or vertebral artery stenosis within the neck. 2. 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 3. No other new significant intracranial arterial stenosis or occlusion. 4. Numerous chronic cerebral and cerebellar infarcts with acute/subacute left temporal lobe infarct. 5. Continued thyroid hypodensities the largest 15 mm upper pole right thyroid. Further evaluation thyroid ultrasound recommended. 6. Paranasal sinus mucosal inflammatory disease. Echocardiogram: EF is 55%. No intracardiac thrombus. Lipid panel: LDL is 57.8, HDL is 62. HbA1c: 6.5  EKG and tele: Normal sinus rhythm     ASSESSMENT/IMPRESSION:   Cerebrovascular accident involving the inferior branches of left MCA, resulting in expressive/receptive aphasia. The underlying etiology is either cardioembolic or artery to artery emboli. She needs holter monitoring for 20-30 days as outpatient.      RECOMMENDATIONS:  1. Continue aspirin 325mg, clopidogrel 75mg and atorvastatin 80mg daily  2. Continue tele monitoring  3. Neuro checks per stroke protocol  4. Normotensive protocol can be initiated.   5. Continue home dose levetiracetam.  6. PT/OT and speech pathology  7. Need to arrange Holter monitoring as outpatient     Dispo planning.     Neurology will sign off at this time. I'll follow the patient as outpatient. Please do not hesitate to return with any questions. Signed:  Santo Bob MD  3/9/2017  8:05 AM    REVIEW OF SYSTEMS: Denies chest pain, abdominal pain, nausea or vomiting. No fever or chills. OBJECTIVE:      Visit Vitals    /68 (BP 1 Location: Left arm, BP Patient Position: At rest)    Pulse 66    Temp 97.6 °F (36.4 °C)    Resp 14    Ht 5' 7\" (1.702 m)    Wt 75.9 kg (167 lb 5.3 oz)    SpO2 100%    Breastfeeding No    BMI 27.85 kg/m2     Physical Exam:  GEN: Alert, NAD  EYES: conjunctiva normal, lids with out lesions  HEENT: MMM. HEART: RRR +S1 +S2  LUNGS: CTA B/L no rales or rhonchi. ABDOMEN: Soft, non-tender. EXTREMITIES: No edema cyanosis  SKIN: no rashes or skin breakdown, no nodules  NEURO: Alert. The patient has expressive/receptive aphasia. She has difficulty following the commands. Face is symmetric. EOMI. Moves all the extremities without any drift.     Current Facility-Administered Medications   Medication Dose Route Frequency    levETIRAcetam (KEPPRA) 500 mg in 100 ml IVPB  500 mg IntraVENous TID    aspirin delayed-release tablet 325 mg  325 mg Oral DAILY    acetaminophen (TYLENOL) tablet 650 mg  650 mg Oral Q6H PRN    labetalol (NORMODYNE;TRANDATE) 20 mg/4 mL (5 mg/mL) injection 10 mg  10 mg IntraVENous Q4H PRN    0.9% sodium chloride infusion  100 mL/hr IntraVENous CONTINUOUS    multivitamin, tx-iron-ca-min (THERA-M w/ IRON) tablet 1 Tab  1 Tab Oral DAILY    cholecalciferol (VITAMIN D3) tablet 2,000 Units  2,000 Units Oral DAILY    fish oil-omega-3 fatty acids 340-1,000 mg capsule 1 Cap  1 Cap Oral DAILY    calcium-vitamin D (OS-CLEMENT) 500 mg-200 unit tablet  1 Tab Oral BID    omeprazole (PRILOSEC) capsule 20 mg  20 mg Oral DAILY    clopidogrel (PLAVIX) tablet 75 mg  75 mg Oral DAILY    atorvastatin (LIPITOR) tablet 80 mg  80 mg Oral DAILY    sertraline (ZOLOFT) tablet 25 mg  25 mg Oral QHS    potassium chloride (K-DUR, KLOR-CON) tablet 10 mEq  10 mEq Oral Q MON, WED & FRI    ALPRAZolam (XANAX) tablet 0.5 mg  0.5 mg Oral QHS PRN    loratadine (CLARITIN) tablet 10 mg  10 mg Oral DAILY PRN    sodium chloride (NS) flush 5-10 mL  5-10 mL IntraVENous Q8H    sodium chloride (NS) flush 5-10 mL  5-10 mL IntraVENous PRN    insulin lispro (HUMALOG) injection   SubCUTAneous AC&HS    glucose chewable tablet 16 g  16 g Oral PRN    glucagon (GLUCAGEN) injection 1 mg  1 mg IntraMUSCular PRN    dextrose (D50W) injection syrg 12.5-25 g  25-50 mL IntraVENous PRN       Laboratory  Recent Results (from the past 24 hour(s))   GLUCOSE, POC    Collection Time: 03/08/17 11:16 AM   Result Value Ref Range    Glucose (POC) 109 70 - 110 mg/dL   GLUCOSE, POC    Collection Time: 03/08/17  4:17 PM   Result Value Ref Range    Glucose (POC) 108 70 - 110 mg/dL   GLUCOSE, POC    Collection Time: 03/08/17  9:59 PM   Result Value Ref Range    Glucose (POC) 121 (H) 70 - 110 mg/dL   GLUCOSE, POC    Collection Time: 03/09/17  6:22 AM   Result Value Ref Range    Glucose (POC) 118 (H) 70 - 110 mg/dL       Radiology:  Mri Brain Wo Cont    Result Date: 3/7/2017  History: CODE S, slurred speech, prior CVA Comparison 8/30/2015 PROCEDURE: Axial spin echo T1, FSE T2, FLAIR, T2 gradient and diffusion sequences, sagittal spin echo T1, and coronal spin echo T1 and T2 sequences of the brain were obtained without gadolinium. FINDINGS:  Diffusion:  There is new restricted diffusion signal involving lateral anterior and mid left temporal lobe local sulcal effacement. Additional small focus of mild increased diffusion signal without low ADC map left parieto-occipital subcortical white matter. Brain parenchyma:  Interval evolution of now chronic right parietal infarct with cystic encephalomalacia and gliosis.  No significant change in cystic encephalomalacia and gliosis at site of chronic infarct inferior lateral left frontal lobe, left frontal perisylvian region, and left occipital lobe, and stable small chronic bilateral cerebellar infarcts. No evidence of new mass or hemorrhage or other new abnormal signal. Ventricles and sulci:  Mild diffuse central and cortical volume loss. Extra-axial:  No extra-axial fluid collection or mass is noted. Brain vasculature:  No vascular abnormality is appreciated on this routine brain MR examination. Craniocervical junction:  Normal. Skull base, extracranial and calvarium:  Prior bilateral lens implants with interval development mucosal thickening inferior maxillary sinuses left greater than right and left sphenoid sinus and bilateral ethmoid sinuses. IACs and mastoids sella and skull unremarkable. Impression: 1. Acute ischemic infarct lateral left temporal lobe. No evidence of hemorrhage. 2. Interval evolution of chronic right parietal infarct. 3. Stable chronic areas of infarct left inferior lateral and perisylvian frontal lobe, left occipital lobe, and bilateral cerebellum. 4. Interval increase mild paranasal sinus mucosal thickening. Ct Head Wo Cont    Result Date: 3/7/2017  Indication:  Headache Comparison:  03/06/17. Dose reduction techniques used: Automated exposure control, adjustment of the mAs and/or kVp according to patient's size, and/or iterative reconstruction techniques. Techniques utilized are listed in the medical record. Findings: Noncontrast head CT. Brain and Cerebellum :  A focal area of encephalomalacia appears evident within the anterior perisylvian left frontal lobe. The ventricles are midline a focal area of diminished gray-white density is evident involving the left temporal lobe compatible with the known acute ischemic infarction. Right posterior frontal high convexity chronic encephalomalacia changes are again seen. No new infarction is evident.  No parenchymal hemorrhage or significant mass effect is demonstrated. Extra axial spaces and meninges:  No hemorrhage or collection evident. Calvarium:  Unremarkable. Paranasal Sinuses:  Left ethmoid sinus mucosal thickening is evident. Other:  None. Delaney Ayala IMPRESSION: Acute left temporal infarction again demonstrated. Chronic left frontal lobe perisylvian encephalomalacia and posterior right high convexity frontal lobe encephalomalacia again evident. No acute hemorrhage or significant mass effect is evident     Ct Head Wo Cont    Result Date: 3/6/2017  EXAM: CT head INDICATION: CODE S, slurred speech COMPARISON: August 30, 2015 TECHNIQUE: Axial CT imaging of the head was performed without intravenous contrast. DOSE REDUCTION:  One or more dose reduction techniques were used on this CT: automated exposure control, adjustment of the mAs and/or kVp according to patient's size, and iterative reconstruction techniques. The specific techniques utilized on this CT exam have been documented in the patient's electronic medical record. _______________ FINDINGS: BRAIN AND POSTERIOR FOSSA: The sulci, folia, ventricles and basal cisterns are within normal limits for the patient?s age. There is no intracranial hemorrhage, mass effect, or midline shift. There is an old left frontal lobe infarct with encephalomalacia unchanged. Patchy areas of low-attenuation seen in the periventricular and subcortical white matter suggesting moderate small vessel ischemic disease. There is an old right parietal lobe infarct with encephalomalacia. An old left frontoparietal lobe infarct with encephalomalacia. EXTRA-AXIAL SPACES AND MENINGES: There are no abnormal extra-axial fluid collections. CALVARIUM: Intact. SINUSES: There is mucoperiosteal thickening of the left ethmoid sinuses suggesting chronic sinus disease. OTHER: None. _______________     IMPRESSION: No acute intracranial abnormalities. Old infarcts. Early changes of acute CVA can be occult on CT. Critical results findings were given to Dr. Any Be 6:49 PM March 6, 2017    Cta Head Neck W Wo Cont    Result Date: 3/7/2017  EXAM:  CT angiogram of the head and neck with intravenous contrast. HISTORY: Slurred speech, CVA COMPARISON: Prior CTA head and neck 8/30/2015 and correlation brain MRI same day. TECHNIQUE:  Three-dimensional, maximum intensity projection, and curved planar reformations were post-processed at a dedicated outside facility ([employer]). Stenoses are reported with reference to the downstream lumen (\"NASCET\"). DOSE REDUCTION: One or more dose reduction techniques were used on this CT: automated exposure control, adjustment of the mAs and/or kVp according to patient's size, and iterative reconstruction techniques. The specific techniques utilized on this CT exam have been documented in the patient's electronic medical record. _______________ FINDINGS:      ARTERIAL BOLUS QUALITY:  Satisfactory. AORTIC ARCH:  Classic 3 vessel arch anatomy with no new significant stenosis proximal arch vessels. RIGHT CAROTID ARTERY:  No significant right common carotid artery stenosis. Mild partially calcified plaque carotid bifurcation, 0% diameter stenosis proximally by NASCET criteria with no significant right cervical ICA stenosis. LEFT CAROTID ARTERY:  No significant left common carotid artery stenosis. 0% diameter stenosis proximal left ICA with mild atherosclerotic plaque. Unremarkable left cervical ICA. VERTEBRAL ARTERIES:  No new significant vertebral artery stenosis or occlusion, left vertebral dominant. BASILAR AND CEREBRAL ARTERIES: Normal-appearing distal vertebral and basilar arteries left vertebral dominant. There is occlusion of the proximal left anterior temporal branch of the MCA best appreciated on coronal MIP reconstruction image #80 which is a new finding. No new significant proximal MCA stenosis bilaterally.  Remainder of middle anterior and posterior cerebral arteries show no significant stenosis filling defect or occlusion. Patent anterior communicating artery and small posterior communicating arteries. No evidence of aneurysm or vascular malformation. Mild atherosclerotic stenosis and tortuosity carotid siphons. Dural venous sinuses unremarkable. NON-ANGIOGRAPHIC FINDINGS: Mild biapical scarring. Persistent thyroid hypodensities the largest 15 mm upper pole right thyroid. Small calcified subcarinal lymph node. No new superior mediastinal mass. No new mass or adenopathy within the neck. Degenerative cervical spondylosis again noted with no acute osseous finding. Mucosal thickening ethmoid and left maxillary sinuses and minimally inferior right maxillary sinus. Mastoids clear. No evidence of intracranial enhancing lesion with hypodensity compatible with area of acute infarct anterior and mid lateral left temporal lobe. Chronic left inferior and perisylvian frontal infarcts and chronic right parietal and left occipital and small cerebellar infarcts. _______________     IMPRESSION: 1. No evidence of new significant carotid artery or vertebral artery stenosis within the neck. 2. 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 3. No other new significant intracranial arterial stenosis or occlusion. 4. Numerous chronic cerebral and cerebellar infarcts with acute/subacute left temporal lobe infarct. 5. Continued thyroid hypodensities the largest 15 mm upper pole right thyroid. Further evaluation thyroid ultrasound recommended. 6. Paranasal sinus mucosal inflammatory disease. Xr Chest Port    Result Date: 3/7/2017  CHEST AP PORTABLE, 1 View Clinical History:  Strokelike symptoms. Comparison:  Several, most recent August 29, 2015. Findings: Stable biapical pleural parenchymal scarring. Stable small nodules in the left upper lobe, likely granulomas. Subtle haziness in the left base. Lungs are mildly hypoinflated.   The cardiac silhouette is within normal limits. The pulmonary vascularity appear within normal limits. The aorta is elongated with arthrosclerotic calcifications. IMPRESSION: 1. Subtle haziness in the left base is likely atelectasis, however developing infiltrate cannot be completely excluded.

## 2017-03-09 NOTE — PROGRESS NOTES
Return call from , plan to discharge pt today,pt will be going to The [address] [place] [address],[address] Floor @[facility] [address]  [place] [address] [phone] pt will need medical transport for safety,please send avs,d/c summary,medication hard scripts,RN please call report prior to pt leaving, pt will need transportation no later than 5pm.Care Management Interventions  PCP Verified by CM: Yes  Palliative Care Consult (Criteria: CHF and RRAT>21): No  Reason for No Palliative Care Consult:  Other (see comment)  Mode of Transport at Discharge: BLS  Transition of Care Consult (CM Consult): SNF  Discharge Durable Medical Equipment: No  Health Maintenance Reviewed: Yes  Physical Therapy Consult: Yes  Occupational Therapy Consult: Yes  Speech Therapy Consult: Yes  Current Support Network: [address] [place] [address]  Confirm Follow Up Transport: Family  Plan discussed with Pt/Family/Caregiver: Yes  Freedom of Choice Offered: Yes  Discharge Location  Discharge Placement: Skilled nursing facility

## 2017-03-09 NOTE — ROUTINE PROCESS
Bedside and Verbal shift change report given to J. Paula Denver RN (oncoming nurse) by Lilian Souza. Gladis Poole RN (offgoing nurse).  Report included the following information SBAR, Kardex, Intake/Output, MAR, Recent Results and Cardiac Rhythm SR.

## 2017-03-09 NOTE — DISCHARGE INSTRUCTIONS
DISCHARGE SUMMARY from Nurse    The following personal items are in your possession at time of discharge:    Dental Appliances: Uppers, Lowers, With patient  Visual Aid: None     Home Medications: None  Jewelry: None  Clothing: None  Other Valuables: None  Personal Items Sent to Safe: none          PATIENT INSTRUCTIONS:    After general anesthesia or intravenous sedation, for 24 hours or while taking prescription Narcotics:  · Limit your activities  · Do not drive and operate hazardous machinery  · Do not make important personal or business decisions  · Do  not drink alcoholic beverages  · If you have not urinated within 8 hours after discharge, please contact your surgeon on call. Report the following to your surgeon:  · Excessive pain, swelling, redness or odor of or around the surgical area  · Temperature over 100.5  · Nausea and vomiting lasting longer than 4 hours or if unable to take medications  · Any signs of decreased circulation or nerve impairment to extremity: change in color, persistent  numbness, tingling, coldness or increase pain  · Any questions        What to do at Home:  Recommended activity: Activity as tolerated. *  Please give a list of your current medications to your Primary Care Provider. *  Please update this list whenever your medications are discontinued, doses are      changed, or new medications (including over-the-counter products) are added. *  Please carry medication information at all times in case of emergency situations. These are general instructions for a healthy lifestyle:    No smoking/ No tobacco products/ Avoid exposure to second hand smoke    Surgeon General's Warning:  Quitting smoking now greatly reduces serious risk to your health.     Obesity, smoking, and sedentary lifestyle greatly increases your risk for illness    A healthy diet, regular physical exercise & weight monitoring are important for maintaining a healthy lifestyle    You may be retaining fluid if you have a history of heart failure or if you experience any of the following symptoms:  Weight gain of 3 pounds or more overnight or 5 pounds in a week, increased swelling in our hands or feet or shortness of breath while lying flat in bed. Please call your doctor as soon as you notice any of these symptoms; do not wait until your next office visit. Recognize signs and symptoms of STROKE:    F-face looks uneven    A-arms unable to move or move unevenly    S-speech slurred or non-existent    T-time-call 911 as soon as signs and symptoms begin-DO NOT go       Back to bed or wait to see if you get better-TIME IS BRAIN. Warning Signs of HEART ATTACK     Call 911 if you have these symptoms:   Chest discomfort. Most heart attacks involve discomfort in the center of the chest that lasts more than a few minutes, or that goes away and comes back. It can feel like uncomfortable pressure, squeezing, fullness, or pain.  Discomfort in other areas of the upper body. Symptoms can include pain or discomfort in one or both arms, the back, neck, jaw, or stomach.  Shortness of breath with or without chest discomfort.  Other signs may include breaking out in a cold sweat, nausea, or lightheadedness. Don't wait more than five minutes to call 911 - MINUTES MATTER! Fast action can save your life. Calling 911 is almost always the fastest way to get lifesaving treatment. Emergency Medical Services staff can begin treatment when they arrive -- up to an hour sooner than if someone gets to the hospital by car. The discharge information has been reviewed with the patient. The patient verbalized understanding. Discharge medications reviewed with the patient and appropriate educational materials and side effects teaching were provided. Stroke: Care Instructions  Your Care Instructions    You have had a stroke.  This means that the blood flow to a part of your brain was blocked for some time, which damages the nerve cells in that part of the brain. The part of your body controlled by that part of your brain may not function properly now. The brain is an amazing organ that can heal itself to some degree. The stroke you had damaged part of your brain. But other parts of your brain may take over in some way for the damaged areas. You have already started this process. Your doctor will talk with you about what you can do to prevent another stroke. High blood pressure, high cholesterol, and diabetes are all risk factors for stroke. If you have any of these conditions, work with your doctor to make sure they are under control. Other risk factors for stroke include being overweight, smoking, and not getting regular exercise. Going home may be hard for you and your family. The more you can try to do for yourself, the better. Remember to take each day one at a time. Follow-up care is a key part of your treatment and safety. Be sure to make and go to all appointments, and call your doctor if you are having problems. It's also a good idea to know your test results and keep a list of the medicines you take. How can you care for yourself at home? · Enter a stroke rehabilitation (rehab) program, if your doctor recommends it. Physical, speech, and occupational therapies can help you manage bathing, dressing, eating, and other basics of daily living. · Do not drive until your doctor says it is okay. · It is normal to feel sad or depressed after a stroke. If these feelings last, talk to your doctor. · If you are having problems with urine leakage, go to the bathroom at regular times, including when you first wake up and at bedtime. Also, limit fluids after dinner. · If you are constipated, drink plenty of fluids, enough so that your urine is light yellow or clear like water.  If you have kidney, heart, or liver disease and have to limit fluids, talk with your doctor before you increase the amount of fluids you drink. Set up a regular time for using the toilet. If you continue to have constipation, your doctor may suggest using a bulking agent, such as Metamucil, or a stool softener, laxative, or enema. Medicines  · Take your medicines exactly as prescribed. Call your doctor if you think you are having a problem with your medicine. You may be taking several medicines. ACE (angiotensin-converting enzyme) inhibitors, angiotensin II receptor blockers (ARBs), beta-blockers, diuretics (water pills), and calcium channel blockers control your blood pressure. Statins help lower cholesterol. Your doctor may also prescribe medicines for depression, pain, sleep problems, anxiety, or agitation. · If your doctor has given you a blood thinner to prevent another stroke, be sure you get instructions about how to take your medicine safely. Blood thinners can cause serious bleeding problems. · Do not take any over-the-counter medicines or herbal products without talking to your doctor first.  · If you take birth control pills or hormone therapy, talk to your doctor about whether they are right for you. For family members and caregivers  · Make the home safe. Set up a room so that your loved one does not have to climb stairs. Be sure the bathroom is on the same floor. Move throw rugs and furniture that could cause falls. Make sure that the lighting is good. Put grab bars and seats in tubs and showers. · Find out what your loved one can do and what he or she needs help with. Try not to do things for your loved one that your loved one can do on his or her own. Help him or her learn and practice new skills. · Visit and talk with your loved one often. Try doing activities together that you both enjoy, such as playing cards or board games. Keep in touch with your loved one's friends as much as you can. Encourage them to visit. · Take care of yourself. Do not try to do everything yourself. Ask other family members to help.  Eat well, get enough rest, and take time to do things that you enjoy. Keep up with your own doctor visits, and make sure to take your medicines regularly. Get out of the house as much as you can. Join a local support group. Find out if you qualify for home health care visits to help with rehab or for adult day care. When should you call for help? Call 911 anytime you think you may need emergency care. For example, call if:  · You have signs of another stroke. These may include:  ¨ Sudden numbness, tingling, weakness, or loss of movement in your face, arm, or leg, especially on only one side of your body. ¨ Sudden vision changes. ¨ Sudden trouble speaking. ¨ Sudden confusion or trouble understanding simple statements. ¨ Sudden problems with walking or balance. ¨ A sudden, severe headache that is different from past headaches. Call 911 even if these symptoms go away in a few minutes. Call your doctor now or seek immediate medical care if:  · You have new symptoms that may be related to your stroke, such as falls or trouble swallowing. Watch closely for changes in your health, and be sure to contact your doctor if you have any problems. Where can you learn more? Go to http://candelaria-haylie.info/. Enter F191 in the search box to learn more about \"Stroke: Care Instructions. \"  Current as of: June 4, 2016  Content Version: 11.1  © 9454-2256 [employer]. Care instructions adapted under license by [employer] (which disclaims liability or warranty for this information). If you have questions about a medical condition or this instruction, always ask your healthcare professional. [place] [address] any warranty or liability for your use of this information.

## 2017-03-09 NOTE — PROGRESS NOTES
Problem: Mobility Impaired (Adult and Pediatric)  Goal: *Acute Goals and Plan of Care (Insert Text)  Physical Therapy Goals  Initiated 3/7/2017 and to be accomplished within 7 day(s)  1. Patient will move from supine to sit and sit to supine in bed with supervision/set-up. 2. Patient will transfer from bed to chair and chair to bed with supervision/set-up using the least restrictive device. 3. Patient will perform sit to stand with supervision/set-up. 4. Patient will ambulate with supervision/set-up for >150 feet with the least restrictive device. 5. Patient will ascend/descend 4 stairs with handrail(s) with minimal assistance/contact guard assist for safe home entry. Outcome: Progressing Towards Goal  PHYSICAL THERAPY TREATMENT     Patient: Nancy Medrano (89 y.o. female)  Date: 3/9/2017  Diagnosis: Stroke [facility])  Expressive aphasia  Dehydration, mild  Expressive aphasia Expressive aphasia       Precautions: Fall   Chart, physical therapy assessment, plan of care and goals were reviewed. ASSESSMENT:  Pt mobilizing well, with continuous cuing. Not safe without RW at this time. Progression toward goals:  [ ]      Improving appropriately and progressing toward goals  [X]      Improving slowly and progressing toward goals  [ ]      Not making progress toward goals and plan of care will be adjusted       PLAN:  Patient continues to benefit from skilled intervention to address the above impairments. Continue treatment per established plan of care. Discharge Recommendations:  Inpatient Rehab or To Be Determined  Further Equipment Recommendations for Discharge:  bedside commode and rolling walker       SUBJECTIVE:   Patient stated Ok.       OBJECTIVE DATA SUMMARY:   Critical Behavior:  Neurologic State: Alert  Orientation Level: Oriented to person  Cognition: Decreased command following, Decreased attention/concentration  Safety/Judgement: Lack of insight into deficits  Functional Mobility Training:  Bed Mobility:  Rolling: Supervision  Supine to Sit: Supervision  Sit to Supine: Supervision  Transfers:  Sit to Stand: Contact guard assistance  Stand to Sit: Contact guard assistance  Balance:  Sitting: Intact  Standing: Intact; With support  Standing - Static: Good  Standing - Dynamic : Fair  Ambulation/Gait Training:  Distance (ft): 400 Feet (ft)  Assistive Device: Gait belt;Walker, rolling  Ambulation - Level of Assistance: Stand-by asssistance;Contact guard assistance  Gait Abnormalities: Path deviations  Base of Support: Widened  Speed/Gali: Fluctuations  Step Length: Left lengthened;Right lengthened  Interventions: Verbal cues; Safety awareness training;Visual/Demos; Tactile cues  Therapeutic Exercises:      Pain:  Pain Scale 1: Numeric (0 - 10)  Pain Intensity 1: 10  Pain Location 1: Head  Pain Orientation 1: Anterior  Pain Intervention(s) 1: Medication (see MAR)  Activity Tolerance:   Good  Please refer to the flowsheet for vital signs taken during this treatment.   After treatment:   [ ] Patient left in no apparent distress sitting up in chair  [X] Patient left in no apparent distress in bed  [X] Call bell left within reach  [X] Nursing notified  [ ] Caregiver present  [ ] Bed alarm activated      Dangelo King PTA   Time Calculation: 35 mins

## 2017-03-09 NOTE — DISCHARGE SUMMARY
Discharge Summary    Patient: Nilson Orozco MRN: [id number]  CSN: [id number]    YOB: 1935  Age: 80 y.o. Sex: female    DOA: 3/6/2017 LOS:  LOS: 3 days   Discharge Date:      Primary Care Provider:  Elías Gannon MD    Admission Diagnoses: Stroke [facility])  Expressive aphasia  Dehydration, mild  Expressive aphasia    Discharge Diagnoses:    Problem List as of 3/9/2017  Date Reviewed: 3/6/2017          Codes Class Noted - Resolved    * (Principal)Expressive aphasia ICD-10-CM: R47.01  ICD-9-CM: 784.3  3/6/2017 - Present        Stroke ([facility] [address] ICD-10-CM: I63.9  ICD-9-CM: 434.91  3/6/2017 - Present        Dehydration, mild ICD-10-CM: E86.0  ICD-9-CM: 276.51  3/6/2017 - Present        Seizure ([facility] [address] ICD-10-CM: R56.9  ICD-9-CM: 780.39  3/6/2017 - Present        Hypertension ICD-10-CM: I10  ICD-9-CM: 401.9  Unknown - Present        Diabetes ([facility] [address] ICD-10-CM: E11.9  ICD-9-CM: 250.00  Unknown - Present        Osteoarthritis of left hip (Chronic) ICD-10-CM: M16.12  ICD-9-CM: 715.95  12/10/2012 - Present        RESOLVED: CVA (cerebral infarction) ICD-10-CM: I63.9  ICD-9-CM: 434.91  8/30/2015 - 3/6/2017        RESOLVED: Encephalopathy ICD-10-CM: G93.40  ICD-9-CM: 348.30  8/30/2015 - 9/1/2015        RESOLVED: UTI (urinary tract infection) ICD-10-CM: N39.0  ICD-9-CM: 599.0  8/29/2015 - 3/6/2017              Discharge Medications:     Current Discharge Medication List      CONTINUE these medications which have CHANGED    Details   aspirin delayed-release 325 mg tablet Take 1 Tab by mouth daily. Qty: 10 Tab, Refills: 0         CONTINUE these medications which have NOT CHANGED    Details   sertraline (ZOLOFT) 25 mg tablet Take 25 mg by mouth nightly. furosemide (LASIX) 20 mg tablet Take 20 mg by mouth every Monday, Wednesday, Friday. potassium chloride (K-DUR, KLOR-CON) 10 mEq tablet Take 10 mEq by mouth every Monday, Wednesday, Friday.  Take with lasix      levETIRAcetam (KEPPRA) 500 mg tablet Take 500 mg by mouth three (3) times daily. atorvastatin (LIPITOR) 80 mg tablet Take 1 Tab by mouth daily. Qty: 30 Tab, Refills: 5      amlodipine (NORVASC) 5 mg tablet Take 1 tablet by mouth daily. Qty: 20 tablet, Refills: 0      clopidogrel (PLAVIX) 75 mg tablet Take 1 tablet by mouth daily. Qty: 30 tablet, Refills: 0      metformin (GLUCOPHAGE) 850 mg tablet Take 500 mg by mouth every evening. omeprazole (PRILOSEC) 20 mg capsule Take 20 mg by mouth daily. omega-3 fatty acids-vitamin e (FISH OIL) 1,000 mg cap Take 1 Cap by mouth daily. calcium-cholecalciferol, d3, (CALCIUM 600 + D) 600-125 mg-unit tab Take 600 mg by mouth two (2) times a day. hydrochlorothiazide (MICROZIDE) 12.5 mg capsule Take 12.5 mg by mouth daily. losartan (COZAAR) 100 mg tablet Take 100 mg by mouth daily. metoprolol-XL (TOPROL XL) 50 mg XL tablet Take 50 mg by mouth nightly. multivitamins-minerals-lutein (CENTRUM SILVER) Tab Take  by mouth. Cholecalciferol, Vitamin D3, 2,000 unit cap Take  by mouth daily. STOP taking these medications       nitrofurantoin (MACRODANTIN) 50 mg capsule Comments:   Reason for Stopping:         ALPRAZolam (XANAX) 0.5 mg tablet Comments:   Reason for Stopping:         Nawilkyz-Ygwz-Qqqqcy-Hyalur Ac [id number] mg cap Comments:   Reason for Stopping:         loratadine (CLARITIN) 10 mg tablet Comments:   Reason for Stopping:         trandolapril 4 mg tablet Comments:   Reason for Stopping:               Discharge Condition: Good      Consults: Neurology      PHYSICAL EXAM   Visit Vitals    /71    Pulse 74    Temp 97.8 °F (36.6 °C)    Resp 16    Ht 5' 7\" (1.702 m)    Wt 75.9 kg (167 lb 5.3 oz)    SpO2 100%    Breastfeeding No    BMI 27.85 kg/m2     General: Awake, cooperative, no acute distress    HEENT: NC, Atraumatic. PERRLA, EOMI. Anicteric sclerae. Lungs:  CTA Bilaterally. No Wheezing/Rhonchi/Rales.   Heart:  Regular  rhythm,  No murmur, No Rubs, No Gallops  Abdomen: Soft, Non distended, Non tender. +Bowel sounds,   Extremities: No c/c/e  Psych:   Not anxious or agitated. Neurologic:  Expressive and receptive aphasia. Admission HPI : Per Dr. Lopez Myeshas is a 80 y.o. female who with medical hx as listed was brought to the hospital by her  for stroke eval. Hx per  with the patients permission as she is having difficulty speaking.  states they both have been suffering from some sort of URI symptoms of the past week. Today after they both went to take a nap around 2pm, patient went in their guest room to take a nap which was unsual of her to him. He at first thought she wants to sleep in that room because they both are sick. Later on when he went to go wake her up around 5 pm, he noticed she was having difficulty speaking, confused and unable to recognize him. He thought something was wrong and brought her to the Ed right away. She was last seen normal at her baseline prior to going to bed around 2pm earlier today. She did not have any complaints at that time. She had a major stroke back in 1980s which involved facial droop and speech impairment and took her several months to get back to her baseline. He doesn't remember the details of her treatment at the time as he wasn't  to her then. But since then she has had some TIAs in between, last one being about couple of years ago. She also suffered from her first generalized seizure in Jan 2018. She was taken to [facility] where she was started on Keppra and told it was due to scar tissue from her previous strokes. No other complaint at this time including chest pain, palpitations, sob, abd pain, nausea, vomiting, headache, blurry vision.       In the ED she was evaluated by tele neuro who suggested she is not a tPA candidate. Dr Nancy Perkins was informed who recommended to obtain CTA head and neck and MRI brain.  They are done, results are pending.       Hospital Course :   CVA - MRI showed Acute ischemic infarct lateral left temporal lobe. Symptoms of expressive and receptive aphasia. Neurology following,CTA head and neck with no sig. Echo with normal LVF, EF of 55%, no intra cardiac shunt. She was already on aspirin and plavix. Her aspirin increased to 325 mg daily. She will continue with lipitor 80 mg daily. She worked with PT/OT, rehab recommended, she also need to get speech therapy        HTN - initially we maintained Permissive BP, used labetalol prn for BP above 200/110. After 48 hours started on her home medications.       Seizure disorder - continued keppra.      DM - used SSI             Activity: Activity as tolerated    Diet: Diabetic Diet    Follow-up: PCP,     Disposition: rehab    Minutes spent on discharge: 55       Labs: Results:       Chemistry Recent Labs      03/06/17 1900   GLU  133*   NA  133*   K  3.9   CL  97*   CO2  27   BUN  10   CREA  1.03   CA  9.2   AGAP  9   BUCR  10*   AP  88   TP  7.8   ALB  3.9   GLOB  3.9   AGRAT  1.0      CBC w/Diff Recent Labs      03/07/17   0559  03/06/17 1900   WBC  8.7  6.9   RBC  4.05*  4.25   HGB  12.3  12.9   HCT  36.3  37.9   PLT  206  207   GRANS   --   61   LYMPH   --   31   EOS   --   2      Cardiac Enzymes Recent Labs      03/06/17   1900   CPK  61   CKND1  Cannot be calulated      Coagulation Recent Labs      03/06/17 1900   PTP  12.0   INR  0.9   APTT  26.5       Lipid Panel Lab Results   Component Value Date/Time    Cholesterol, total 134 03/07/2017 05:59 AM    HDL Cholesterol 62 03/07/2017 05:59 AM    LDL, calculated 57.8 03/07/2017 05:59 AM    VLDL, calculated 14.2 03/07/2017 05:59 AM    Triglyceride 71 03/07/2017 05:59 AM    CHOL/HDL Ratio 2.2 03/07/2017 05:59 AM      BNP No results for input(s): BNPP in the last 72 hours.    Liver Enzymes Recent Labs      03/06/17 1900   TP  7.8   ALB  3.9   AP  88   SGOT  19      Thyroid Studies Lab Results   Component Value Date/Time TSH 0.58 03/06/2017 07:00 PM            Significant Diagnostic Studies: Mri Brain Wo Cont    Result Date: 3/7/2017  History: CODE S, slurred speech, prior CVA Comparison 8/30/2015 PROCEDURE: Axial spin echo T1, FSE T2, FLAIR, T2 gradient and diffusion sequences, sagittal spin echo T1, and coronal spin echo T1 and T2 sequences of the brain were obtained without gadolinium. FINDINGS:  Diffusion:  There is new restricted diffusion signal involving lateral anterior and mid left temporal lobe local sulcal effacement. Additional small focus of mild increased diffusion signal without low ADC map left parieto-occipital subcortical white matter. Brain parenchyma:  Interval evolution of now chronic right parietal infarct with cystic encephalomalacia and gliosis. No significant change in cystic encephalomalacia and gliosis at site of chronic infarct inferior lateral left frontal lobe, left frontal perisylvian region, and left occipital lobe, and stable small chronic bilateral cerebellar infarcts. No evidence of new mass or hemorrhage or other new abnormal signal. Ventricles and sulci:  Mild diffuse central and cortical volume loss. Extra-axial:  No extra-axial fluid collection or mass is noted. Brain vasculature:  No vascular abnormality is appreciated on this routine brain MR examination. Craniocervical junction:  Normal. Skull base, extracranial and calvarium:  Prior bilateral lens implants with interval development mucosal thickening inferior maxillary sinuses left greater than right and left sphenoid sinus and bilateral ethmoid sinuses. IACs and mastoids sella and skull unremarkable. Impression: 1. Acute ischemic infarct lateral left temporal lobe. No evidence of hemorrhage. 2. Interval evolution of chronic right parietal infarct. 3. Stable chronic areas of infarct left inferior lateral and perisylvian frontal lobe, left occipital lobe, and bilateral cerebellum.  4. Interval increase mild paranasal sinus mucosal thickening. Ct Head Wo Cont    Result Date: 3/7/2017  Indication:  Headache Comparison:  03/06/17. Dose reduction techniques used: Automated exposure control, adjustment of the mAs and/or kVp according to patient's size, and/or iterative reconstruction techniques. Techniques utilized are listed in the medical record. Findings: Noncontrast head CT. Brain and Cerebellum :  A focal area of encephalomalacia appears evident within the anterior perisylvian left frontal lobe. The ventricles are midline a focal area of diminished gray-white density is evident involving the left temporal lobe compatible with the known acute ischemic infarction. Right posterior frontal high convexity chronic encephalomalacia changes are again seen. No new infarction is evident. No parenchymal hemorrhage or significant mass effect is demonstrated. Extra axial spaces and meninges:  No hemorrhage or collection evident. Calvarium:  Unremarkable. Paranasal Sinuses:  Left ethmoid sinus mucosal thickening is evident. Other:  None. Zuniga Grant IMPRESSION: Acute left temporal infarction again demonstrated. Chronic left frontal lobe perisylvian encephalomalacia and posterior right high convexity frontal lobe encephalomalacia again evident. No acute hemorrhage or significant mass effect is evident     Ct Head Wo Cont    Result Date: 3/6/2017  EXAM: CT head INDICATION: CODE S, slurred speech COMPARISON: August 30, 2015 TECHNIQUE: Axial CT imaging of the head was performed without intravenous contrast. DOSE REDUCTION:  One or more dose reduction techniques were used on this CT: automated exposure control, adjustment of the mAs and/or kVp according to patient's size, and iterative reconstruction techniques.  The specific techniques utilized on this CT exam have been documented in the patient's electronic medical record. _______________ FINDINGS: BRAIN AND POSTERIOR FOSSA: The sulci, folia, ventricles and basal cisterns are within normal limits for the patient?s age. There is no intracranial hemorrhage, mass effect, or midline shift. There is an old left frontal lobe infarct with encephalomalacia unchanged. Patchy areas of low-attenuation seen in the periventricular and subcortical white matter suggesting moderate small vessel ischemic disease. There is an old right parietal lobe infarct with encephalomalacia. An old left frontoparietal lobe infarct with encephalomalacia. EXTRA-AXIAL SPACES AND MENINGES: There are no abnormal extra-axial fluid collections. CALVARIUM: Intact. SINUSES: There is mucoperiosteal thickening of the left ethmoid sinuses suggesting chronic sinus disease. OTHER: None. _______________     IMPRESSION: No acute intracranial abnormalities. Old infarcts. Early changes of acute CVA can be occult on CT. Critical results findings were given to Dr. Ngoc Arnold 6:49 PM March 6, 2017    Cta Head Neck W Wo Cont    Result Date: 3/7/2017  EXAM:  CT angiogram of the head and neck with intravenous contrast. HISTORY: Slurred speech, CVA COMPARISON: Prior CTA head and neck 8/30/2015 and correlation brain MRI same day. TECHNIQUE:  Three-dimensional, maximum intensity projection, and curved planar reformations were post-processed at a dedicated outside facility ([employer]). Stenoses are reported with reference to the downstream lumen (\"NASCET\"). DOSE REDUCTION: One or more dose reduction techniques were used on this CT: automated exposure control, adjustment of the mAs and/or kVp according to patient's size, and iterative reconstruction techniques. The specific techniques utilized on this CT exam have been documented in the patient's electronic medical record. _______________ FINDINGS:      ARTERIAL BOLUS QUALITY:  Satisfactory. AORTIC ARCH:  Classic 3 vessel arch anatomy with no new significant stenosis proximal arch vessels. RIGHT CAROTID ARTERY:  No significant right common carotid artery stenosis.  Mild partially calcified plaque carotid bifurcation, 0% diameter stenosis proximally by NASCET criteria with no significant right cervical ICA stenosis. LEFT CAROTID ARTERY:  No significant left common carotid artery stenosis. 0% diameter stenosis proximal left ICA with mild atherosclerotic plaque. Unremarkable left cervical ICA. VERTEBRAL ARTERIES:  No new significant vertebral artery stenosis or occlusion, left vertebral dominant. BASILAR AND CEREBRAL ARTERIES: Normal-appearing distal vertebral and basilar arteries left vertebral dominant. There is occlusion of the proximal left anterior temporal branch of the MCA best appreciated on coronal MIP reconstruction image #80 which is a new finding. No new significant proximal MCA stenosis bilaterally. Remainder of middle anterior and posterior cerebral arteries show no significant stenosis filling defect or occlusion. Patent anterior communicating artery and small posterior communicating arteries. No evidence of aneurysm or vascular malformation. Mild atherosclerotic stenosis and tortuosity carotid siphons. Dural venous sinuses unremarkable. NON-ANGIOGRAPHIC FINDINGS: Mild biapical scarring. Persistent thyroid hypodensities the largest 15 mm upper pole right thyroid. Small calcified subcarinal lymph node. No new superior mediastinal mass. No new mass or adenopathy within the neck. Degenerative cervical spondylosis again noted with no acute osseous finding. Mucosal thickening ethmoid and left maxillary sinuses and minimally inferior right maxillary sinus. Mastoids clear. No evidence of intracranial enhancing lesion with hypodensity compatible with area of acute infarct anterior and mid lateral left temporal lobe. Chronic left inferior and perisylvian frontal infarcts and chronic right parietal and left occipital and small cerebellar infarcts. _______________     IMPRESSION: 1. No evidence of new significant carotid artery or vertebral artery stenosis within the neck.  2. 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 3. No other new significant intracranial arterial stenosis or occlusion. 4. Numerous chronic cerebral and cerebellar infarcts with acute/subacute left temporal lobe infarct. 5. Continued thyroid hypodensities the largest 15 mm upper pole right thyroid. Further evaluation thyroid ultrasound recommended. 6. Paranasal sinus mucosal inflammatory disease. Xr Chest Port    Result Date: 3/7/2017  CHEST AP PORTABLE, 1 View Clinical History:  Strokelike symptoms. Comparison:  Several, most recent August 29, 2015. Findings: Stable biapical pleural parenchymal scarring. Stable small nodules in the left upper lobe, likely granulomas. Subtle haziness in the left base. Lungs are mildly hypoinflated. The cardiac silhouette is within normal limits. The pulmonary vascularity appear within normal limits. The aorta is elongated with arthrosclerotic calcifications. IMPRESSION: 1. Subtle haziness in the left base is likely atelectasis, however developing infiltrate cannot be completely excluded. No results found for this or any previous visit.         CC: Zack Child MD

## 2017-03-09 NOTE — ROUTINE PROCESS
Shift summary:  Pt remained stable. No acute changes. Family at bedside throughout much of the day. Pt able to swallow pills whole with water today.   Pt up to the bathroom with 1 to 2 person assist.

## 2017-03-09 NOTE — PROGRESS NOTES
Problem: Dysphagia (Adult)  Goal: *Acute Goals and Plan of Care (Insert Text)  Dysphagia Present: mild    Recommendations:  Diet: regular diet  Meds: as tolerated  Aspiration Precautions  Other: single sips, small bites, slow rate, alternate solids/liquids       Outcome: Resolved/Met Date Met:  03/09/17  SPEECH LANGUAGE PATHOLOGY DYSPHAGIA TREATMENT/DISCHARGE     Patient: Enedina Pradhan (53 y.o. female)  Date: 3/9/2017  Diagnosis: Stroke [facility])  Expressive aphasia  Dehydration, mild  Expressive aphasia Expressive aphasia       Precautions: aspiration Fall      ASSESSMENT:  Mild oropharyngeal dysphagia     Dysphagia f/u completed this PM with spouse at bedside. Pt self-feeding from lunch tray of thin liquids (water and soup), mech soft and regular solid items. Meal tolerated without overt s/s distress noted. Pt self-fed at slow rate with small sips/bites with 100% oral clearance. Continues with brief swallow delay however functional hyolaryngeal excursion and clear vocal quality throughout. Recommend continue regular diet with general swallow safety precautions. Educated spouse re: results/recommendations and swallow safety as above. No further acute dysphagia needs, will complete POC and continue to address language deficits. Progression toward goals:  [X]         Improving appropriately and progressing toward goals  [ ]         Improving slowly and progressing toward goals  [ ]         Not making progress toward goals and plan of care will be adjusted       PLAN: regular diet, general swallow safety precautions  Recommendations and Planned Interventions:  No further acute dysphagia needs, will complete POC and continue language tx  Patient continues to benefit from skilled intervention to address the above impairments. Continue treatment per established plan of care. Discharge Recommendations:  Skilled Nursing Facility       SUBJECTIVE:   Patient stated It's good (re: soup).       OBJECTIVE:   Cognitive and Communication Status:  Neurologic State: Alert  Orientation Level: Oriented to person  Cognition: Decreased command following, Decreased attention/concentration  Perception: Appears intact  Perseveration: Perseverates during conversation  Safety/Judgement: Lack of insight into deficits  Dysphagia Treatment:  Oral Assessment:  Oral Assessment  Labial: No impairment  Dentition: Upper & lower dentures  Oral Hygiene: good  Lingual: No impairment  Velum: No impairment  Mandible: No impairment  P.O. Trials:              Patient Position: 90 in bed              Vocal quality prior to P.O.: No impairment              Consistency Presented:  Thin liquid, Mechanical soft, Solid              How Presented: Self-fed/presented, Cup/sip, Spoon              How Much:  (meal)              Bolus Acceptance: No impairment              Bolus Formation/Control: No impairment              Type of Impairment: Mastication, Delayed              Propulsion: No impairment              Oral Residue: None              Initiation of Swallow: Delayed (# of seconds)              Laryngeal Elevation: Functional              Aspiration Signs/Symptoms: None              Pharyngeal Phase Characteristics: No impairment, issues, or problems               Effective Modifications: Small sips and bites              Cues for Modifications: None                                Oral Phase Severity: No impairment              Pharyngeal Phase Severity : Mild PAIN:  Start of Tx: 0  End of Tx: 0      After treatment:   [ ]              Patient left in no apparent distress sitting up in chair  [X]              Patient left in no apparent distress in bed  [X]              Call bell left within reach  [ ]              Nursing notified  [X]              Family present  [ ]              Caregiver present  [ ]              Bed alarm activated         COMMUNICATION/EDUCATION:   [X]        Aspiration precautions; swallow safety; compensatory techniques  [ ]        Patient unable to participate in education; education ongoing with staff  [ ]         Posted safety precautions in patient's room.   [ ]         Oral-motor/laryngeal strengthening exercises        Malick Monreal, SLP  Time Calculation: 8 mins

## 2017-03-09 NOTE — PROGRESS NOTES
Problem: Communication Impaired (Adult)  Goal: *Acute Goals and Plan of Care (Insert Text)  Goals:  1. Pt will following 1-step commands in 50% trials with max A/demonstration. 2. Pt will respond to contextual/egocentric yes/no questions using multi-modal approach in 50% trials with max A.  3. Pt will produce automatic/rote speech using multi-modal approach in 50% trials with max A. Outcome: Progressing Towards Goal  SPEECH LANGUAGE PATHOLOGY TREATMENT     Patient: Steven Victor (89 y.o. female)  Date: 3/9/2017  Diagnosis: Stroke [facility])  Expressive aphasia  Dehydration, mild  Expressive aphasia Expressive aphasia           ASSESSMENT:  Severe fluent aphasia c/w Wernicke's type     Speech/language goals as above addressed with spouse at bedside. Marked improvement in contextual comments at 1-3 word/phrase level, though does continues to demo perseverative verbal responses. Followed 1-step commands with 6/10 (60%) accuracy with mod-max multi-modal cues (gestural, verbal) with breakdown as context decreased. Attempted automatic speech tasks including \"happy birthday\" x4 trials, eventually with matched pitch in last x2 trials however no appreciable accurate verbal response. Attempted FIB task for associated words again without appreciable accurate response. Educated spouse re: improvements in contextual comprehension and expression and ST goals of care to improve functional communication. Progression toward goals:  [ ]       Improving appropriately and progressing toward goals  [X]       Improving slowly and progressing toward goals  [ ]       Not making progress toward goals and plan of care will be adjusted       PLAN: goals as above  Patient continues to benefit from skilled intervention to address the above impairments. Continue treatment per established plan of care. Discharge Recommendations:  Skilled Nursing Facility       SUBJECTIVE:   Patient stated Thank you.       OBJECTIVE:   Mental Status:  Neurologic State: Alert  Orientation Level: Oriented to person  Cognition: Decreased command following, Decreased attention/concentration  Perception: Appears intact  Perseveration: Perseverates during conversation  Safety/Judgement: Lack of insight into deficits  Treatment & Interventions:     data as above           PAIN:  Start of Tx: 0  End of Tx: 0      After treatment:   [ ]       Patient left in no apparent distress sitting up in chair  [X]       Patient left in no apparent distress in bed  [ ]       Call bell left within reach  [ ]       Nursing notified  [X]       Caregiver present  [ ]       Bed alarm activated         COMMUNICATION/EDUCATION:   [X]             Compensatory speech/language/comprhension techniques     LUIZ Nickerson  Time Calculation: 10 mins

## 2017-03-09 NOTE — PROGRESS NOTES
Insurance auth approved today for rehab at [employer] [facility] \[facility]\""  was notified.

## 2017-03-16 LAB
ATRIAL RATE: 79 BPM
CALCULATED P AXIS, ECG09: 65 DEGREES
CALCULATED R AXIS, ECG10: 48 DEGREES
CALCULATED T AXIS, ECG11: 48 DEGREES
DIAGNOSIS, 93000: NORMAL
P-R INTERVAL, ECG05: 152 MS
Q-T INTERVAL, ECG07: 398 MS
QRS DURATION, ECG06: 86 MS
QTC CALCULATION (BEZET), ECG08: 456 MS
VENTRICULAR RATE, ECG03: 79 BPM

## 2019-01-01 NOTE — PROGRESS NOTES
NEUROLOGY PROGRESS NOTE        Patient: Gordy Ba        Sex: female          DOA: 3/6/2017  YOB: 1935      Age:  80 y.o.         LOS: 2 days     Identification:  [id number] female with prior history of stroke in 1976, diabetes, hypertension and history of seizures, who presented with difficulty with speech.      SUBJECTIVE:   The patient had headaches yesterday took the headaches are results with repeat head CT was unremarkable except for evolving stroke on the left temporal region. She continues to have remarkable aphasia. Stroke Work-up:  Brain MRI: 1. Acute ischemic infarct lateral left temporal lobe. No evidence of hemorrhage. 2. Interval evolution of chronic right parietal infarct. 3. Stable chronic areas of infarct left inferior lateral and perisylvian frontal lobe, left occipital lobe, and bilateral cerebellum. 4. Interval increase mild paranasal sinus mucosal thickening. CTA of head and neck: 1. No evidence of new significant carotid artery or vertebral artery stenosis within the neck. 2. 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 3. No other new significant intracranial arterial stenosis or occlusion. 4. Numerous chronic cerebral and cerebellar infarcts with acute/subacute left temporal lobe infarct. 5. Continued thyroid hypodensities the largest 15 mm upper pole right thyroid. Further evaluation thyroid ultrasound recommended. 6. Paranasal sinus mucosal inflammatory disease. Echocardiogram: EF is 55%. No intracardiac thrombus. Lipid panel: LDL is 57.8, HDL is 62. HbA1c: 6.5  EKG and tele: Normal sinus rhythm    ASSESSMENT/IMPRESSION:   Cerebrovascular accident involving the inferior branches of left MCA, resulting in expressive/receptive aphasia. The underlying etiology is either cardioembolic or artery to artery emboli. She needs holter monitoring for 20 days as outpatient.     RECOMMENDATIONS:  1.  Increase aspirin to 325mg daily, continue, clopidogrel and atorvastatin for now. 2. Continue tele monitoring  3. Neuro checks per stroke protocol  4. Permissive hypertension (do not treat if BP is not >200/110, prefer prn labetolol 5mg)  5. IV normal saline continuous infusion 100-125 ml/h  6. Euglycemia with sliding scale insulin  7. Euthermia with acetaminophen 650mg Q6h prn for fever  8. PT/OT and speech pathology       I will follow the patient. Please do not hesitate to return with any questions. Signed:  Robbie Georges MD  3/8/2017  8:33 AM    REVIEW OF SYSTEMS: Unable to provide due to aphasia. OBJECTIVE:      Visit Vitals    /65 (BP 1 Location: Left arm, BP Patient Position: At rest)    Pulse 82    Temp 97.9 °F (36.6 °C)    Resp 15    Ht 5' 7\" (1.702 m)    Wt 75.9 kg (167 lb 5.3 oz)    SpO2 99%    Breastfeeding No    BMI 27.85 kg/m2     Physical Exam:  GEN: Alert, NAD  EYES: conjunctiva normal, lids with out lesions  HEENT: MMM. HEART: RRR +S1 +S2  LUNGS: CTA B/L no rales or rhonchi. ABDOMEN: Soft, non-tender. EXTREMITIES: No edema cyanosis  SKIN: no rashes or skin breakdown, no nodules  NEURO: Alert. The patient has remarkable expressive/receptive aphasia. She has difficulty even following simple commands. Cranials: Face is symmetric. Smiles symmetrically. EOMI. Tongue is midline. Motor: Full strength at all sites, she is able to move all extremities. Unable to assess coordination or sensation, but moves to noxious stim.     Current Facility-Administered Medications   Medication Dose Route Frequency    levETIRAcetam (KEPPRA) 500 mg in 100 ml IVPB  500 mg IntraVENous TID    acetaminophen (TYLENOL) tablet 650 mg  650 mg Oral Q6H PRN    labetalol (NORMODYNE;TRANDATE) 20 mg/4 mL (5 mg/mL) injection 10 mg  10 mg IntraVENous Q4H PRN    0.9% sodium chloride infusion  100 mL/hr IntraVENous CONTINUOUS    multivitamin, tx-iron-ca-min (THERA-M w/ IRON) tablet 1 Tab  1 Tab Oral DAILY    cholecalciferol (VITAMIN D3) tablet 2,000 Units  2,000 Units Oral DAILY    fish oil-omega-3 fatty acids 340-1,000 mg capsule 1 Cap  1 Cap Oral DAILY    calcium-vitamin D (OS-CLEMENT) 500 mg-200 unit tablet  1 Tab Oral BID    omeprazole (PRILOSEC) capsule 20 mg  20 mg Oral DAILY    clopidogrel (PLAVIX) tablet 75 mg  75 mg Oral DAILY    aspirin delayed-release tablet 81 mg  81 mg Oral DAILY    atorvastatin (LIPITOR) tablet 80 mg  80 mg Oral DAILY    sertraline (ZOLOFT) tablet 25 mg  25 mg Oral QHS    potassium chloride (K-DUR, KLOR-CON) tablet 10 mEq  10 mEq Oral Q MON, WED & FRI    ALPRAZolam (XANAX) tablet 0.5 mg  0.5 mg Oral QHS PRN    loratadine (CLARITIN) tablet 10 mg  10 mg Oral DAILY PRN    sodium chloride (NS) flush 5-10 mL  5-10 mL IntraVENous Q8H    sodium chloride (NS) flush 5-10 mL  5-10 mL IntraVENous PRN    insulin lispro (HUMALOG) injection   SubCUTAneous AC&HS    glucose chewable tablet 16 g  16 g Oral PRN    glucagon (GLUCAGEN) injection 1 mg  1 mg IntraMUSCular PRN    dextrose (D50W) injection syrg 12.5-25 g  25-50 mL IntraVENous PRN       Laboratory  Recent Results (from the past 24 hour(s))   GLUCOSE, POC    Collection Time: 03/07/17 11:16 AM   Result Value Ref Range    Glucose (POC) 119 (H) 70 - 110 mg/dL   GLUCOSE, POC    Collection Time: 03/07/17  4:02 PM   Result Value Ref Range    Glucose (POC) 122 (H) 70 - 110 mg/dL   GLUCOSE, POC    Collection Time: 03/07/17  9:20 PM   Result Value Ref Range    Glucose (POC) 105 70 - 110 mg/dL   GLUCOSE, POC    Collection Time: 03/08/17  6:05 AM   Result Value Ref Range    Glucose (POC) 116 (H) 70 - 110 mg/dL       Radiology:  Mri Brain Wo Cont    Result Date: 3/7/2017  History: CODE S, slurred speech, prior CVA Comparison 8/30/2015 PROCEDURE: Axial spin echo T1, FSE T2, FLAIR, T2 gradient and diffusion sequences, sagittal spin echo T1, and coronal spin echo T1 and T2 sequences of the brain were obtained without gadolinium.  FINDINGS:  Diffusion:  There is new restricted diffusion signal involving lateral anterior and mid left temporal lobe local sulcal effacement. Additional small focus of mild increased diffusion signal without low ADC map left parieto-occipital subcortical white matter. Brain parenchyma:  Interval evolution of now chronic right parietal infarct with cystic encephalomalacia and gliosis. No significant change in cystic encephalomalacia and gliosis at site of chronic infarct inferior lateral left frontal lobe, left frontal perisylvian region, and left occipital lobe, and stable small chronic bilateral cerebellar infarcts. No evidence of new mass or hemorrhage or other new abnormal signal. Ventricles and sulci:  Mild diffuse central and cortical volume loss. Extra-axial:  No extra-axial fluid collection or mass is noted. Brain vasculature:  No vascular abnormality is appreciated on this routine brain MR examination. Craniocervical junction:  Normal. Skull base, extracranial and calvarium:  Prior bilateral lens implants with interval development mucosal thickening inferior maxillary sinuses left greater than right and left sphenoid sinus and bilateral ethmoid sinuses. IACs and mastoids sella and skull unremarkable. Impression: 1. Acute ischemic infarct lateral left temporal lobe. No evidence of hemorrhage. 2. Interval evolution of chronic right parietal infarct. 3. Stable chronic areas of infarct left inferior lateral and perisylvian frontal lobe, left occipital lobe, and bilateral cerebellum. 4. Interval increase mild paranasal sinus mucosal thickening. Ct Head Wo Cont    Result Date: 3/7/2017  Indication:  Headache Comparison:  03/06/17. Dose reduction techniques used: Automated exposure control, adjustment of the mAs and/or kVp according to patient's size, and/or iterative reconstruction techniques. Techniques utilized are listed in the medical record. Findings: Noncontrast head CT.  Brain and Cerebellum :  A focal area of encephalomalacia appears evident within the anterior perisylvian left frontal lobe. The ventricles are midline a focal area of diminished gray-white density is evident involving the left temporal lobe compatible with the known acute ischemic infarction. Right posterior frontal high convexity chronic encephalomalacia changes are again seen. No new infarction is evident. No parenchymal hemorrhage or significant mass effect is demonstrated. Extra axial spaces and meninges:  No hemorrhage or collection evident. Calvarium:  Unremarkable. Paranasal Sinuses:  Left ethmoid sinus mucosal thickening is evident. Other:  None. Ashley Gearing IMPRESSION: Acute left temporal infarction again demonstrated. Chronic left frontal lobe perisylvian encephalomalacia and posterior right high convexity frontal lobe encephalomalacia again evident. No acute hemorrhage or significant mass effect is evident     Ct Head Wo Cont    Result Date: 3/6/2017  EXAM: CT head INDICATION: CODE S, slurred speech COMPARISON: August 30, 2015 TECHNIQUE: Axial CT imaging of the head was performed without intravenous contrast. DOSE REDUCTION:  One or more dose reduction techniques were used on this CT: automated exposure control, adjustment of the mAs and/or kVp according to patient's size, and iterative reconstruction techniques. The specific techniques utilized on this CT exam have been documented in the patient's electronic medical record. _______________ FINDINGS: BRAIN AND POSTERIOR FOSSA: The sulci, folia, ventricles and basal cisterns are within normal limits for the patient?s age. There is no intracranial hemorrhage, mass effect, or midline shift. There is an old left frontal lobe infarct with encephalomalacia unchanged. Patchy areas of low-attenuation seen in the periventricular and subcortical white matter suggesting moderate small vessel ischemic disease. There is an old right parietal lobe infarct with encephalomalacia. An old left frontoparietal lobe infarct with encephalomalacia.  EXTRA-AXIAL SPACES AND MENINGES: There are no abnormal extra-axial fluid collections. CALVARIUM: Intact. SINUSES: There is mucoperiosteal thickening of the left ethmoid sinuses suggesting chronic sinus disease. OTHER: None. _______________     IMPRESSION: No acute intracranial abnormalities. Old infarcts. Early changes of acute CVA can be occult on CT. Critical results findings were given to Dr. Zenia Mitchell 6:49 PM March 6, 2017    Cta Head Neck W Wo Cont    Result Date: 3/7/2017  EXAM:  CT angiogram of the head and neck with intravenous contrast. HISTORY: Slurred speech, CVA COMPARISON: Prior CTA head and neck 8/30/2015 and correlation brain MRI same day. TECHNIQUE:  Three-dimensional, maximum intensity projection, and curved planar reformations were post-processed at a dedicated outside facility ([employer]). Stenoses are reported with reference to the downstream lumen (\"NASCET\"). DOSE REDUCTION: One or more dose reduction techniques were used on this CT: automated exposure control, adjustment of the mAs and/or kVp according to patient's size, and iterative reconstruction techniques. The specific techniques utilized on this CT exam have been documented in the patient's electronic medical record. _______________ FINDINGS:      ARTERIAL BOLUS QUALITY:  Satisfactory. AORTIC ARCH:  Classic 3 vessel arch anatomy with no new significant stenosis proximal arch vessels. RIGHT CAROTID ARTERY:  No significant right common carotid artery stenosis. Mild partially calcified plaque carotid bifurcation, 0% diameter stenosis proximally by NASCET criteria with no significant right cervical ICA stenosis. LEFT CAROTID ARTERY:  No significant left common carotid artery stenosis. 0% diameter stenosis proximal left ICA with mild atherosclerotic plaque. Unremarkable left cervical ICA. VERTEBRAL ARTERIES:  No new significant vertebral artery stenosis or occlusion, left vertebral dominant.       BASILAR AND CEREBRAL ARTERIES: Normal-appearing distal vertebral and basilar arteries left vertebral dominant. There is occlusion of the proximal left anterior temporal branch of the MCA best appreciated on coronal MIP reconstruction image #80 which is a new finding. No new significant proximal MCA stenosis bilaterally. Remainder of middle anterior and posterior cerebral arteries show no significant stenosis filling defect or occlusion. Patent anterior communicating artery and small posterior communicating arteries. No evidence of aneurysm or vascular malformation. Mild atherosclerotic stenosis and tortuosity carotid siphons. Dural venous sinuses unremarkable. NON-ANGIOGRAPHIC FINDINGS: Mild biapical scarring. Persistent thyroid hypodensities the largest 15 mm upper pole right thyroid. Small calcified subcarinal lymph node. No new superior mediastinal mass. No new mass or adenopathy within the neck. Degenerative cervical spondylosis again noted with no acute osseous finding. Mucosal thickening ethmoid and left maxillary sinuses and minimally inferior right maxillary sinus. Mastoids clear. No evidence of intracranial enhancing lesion with hypodensity compatible with area of acute infarct anterior and mid lateral left temporal lobe. Chronic left inferior and perisylvian frontal infarcts and chronic right parietal and left occipital and small cerebellar infarcts. _______________     IMPRESSION: 1. No evidence of new significant carotid artery or vertebral artery stenosis within the neck. 2. 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 3. No other new significant intracranial arterial stenosis or occlusion. 4. Numerous chronic cerebral and cerebellar infarcts with acute/subacute left temporal lobe infarct. 5. Continued thyroid hypodensities the largest 15 mm upper pole right thyroid. Further evaluation thyroid ultrasound recommended. 6. Paranasal sinus mucosal inflammatory disease.     Xr Chest Port    Result Date: 3/7/2017  CHEST AP PORTABLE, 1 View Clinical History:  Strokelike symptoms. Comparison:  Several, most recent August 29, 2015. Findings: Stable biapical pleural parenchymal scarring. Stable small nodules in the left upper lobe, likely granulomas. Subtle haziness in the left base. Lungs are mildly hypoinflated. The cardiac silhouette is within normal limits. The pulmonary vascularity appear within normal limits. The aorta is elongated with arthrosclerotic calcifications. IMPRESSION: 1. Subtle haziness in the left base is likely atelectasis, however developing infiltrate cannot be completely excluded. Statement Selected